# Patient Record
Sex: MALE | Race: WHITE | Employment: FULL TIME | URBAN - METROPOLITAN AREA
[De-identification: names, ages, dates, MRNs, and addresses within clinical notes are randomized per-mention and may not be internally consistent; named-entity substitution may affect disease eponyms.]

---

## 2017-01-12 ENCOUNTER — TELEPHONE (OUTPATIENT)
Dept: INTERNAL MEDICINE CLINIC | Facility: CLINIC | Age: 52
End: 2017-01-12

## 2017-01-12 DIAGNOSIS — E03.9 HYPOTHYROIDISM, UNSPECIFIED TYPE: Primary | ICD-10-CM

## 2017-01-12 NOTE — TELEPHONE ENCOUNTER
Pt calling and asking if his thyroid could be checked     Pt did not wish to make an appt just wants thyroid checked     He states it is the way he is feeling and wishes it to be checked an    If labs are too early he states he does not care if ins does no

## 2017-01-12 NOTE — TELEPHONE ENCOUNTER
TSH and free T4 ordered; please call pt; labs placed for [facility]; if pt requests [facility] lab order, then let me know; please call pt

## 2017-01-12 NOTE — TELEPHONE ENCOUNTER
Patient called back, states for the last month has had very little energy, believes it may be due to his thyroid, would like labs to get it checked.  To  to please advise

## 2017-01-12 NOTE — TELEPHONE ENCOUNTER
I left message letting patient know that TSH and free T4 ordered by Neftali Willis and order is for [facility] lab. I asked patient to call back and let us know if he would like to get it done at [facility] or another lab.

## 2017-01-26 ENCOUNTER — APPOINTMENT (OUTPATIENT)
Dept: LAB | Age: 52
End: 2017-01-26
Attending: INTERNAL MEDICINE
Payer: COMMERCIAL

## 2017-01-26 PROCEDURE — 84439 ASSAY OF FREE THYROXINE: CPT | Performed by: INTERNAL MEDICINE

## 2017-01-26 PROCEDURE — 84443 ASSAY THYROID STIM HORMONE: CPT | Performed by: INTERNAL MEDICINE

## 2017-01-26 PROCEDURE — 36415 COLL VENOUS BLD VENIPUNCTURE: CPT | Performed by: INTERNAL MEDICINE

## 2017-01-30 ENCOUNTER — TELEPHONE (OUTPATIENT)
Dept: INTERNAL MEDICINE CLINIC | Facility: CLINIC | Age: 52
End: 2017-01-30

## 2017-01-30 DIAGNOSIS — E03.9 HYPOTHYROIDISM, UNSPECIFIED TYPE: Primary | ICD-10-CM

## 2017-01-31 NOTE — TELEPHONE ENCOUNTER
TSH 6.67 on levothyroxine 50 mcg po qD; Rec- advise increase levothyroxine to 75 mcg po qD and re-check TFTs (ordered) in one month; please call pt and please call Rx

## 2017-02-01 RX ORDER — LEVOTHYROXINE SODIUM 0.05 MG/1
TABLET ORAL
Qty: 1 TABLET | Refills: 0 | COMMUNITY
Start: 2017-02-01 | End: 2017-02-15 | Stop reason: DRUGHIGH

## 2017-02-01 NOTE — TELEPHONE ENCOUNTER
Relayed MD's message to patient---verbalized understanding  Pt will take 1.5 tablets of levothyroxine 50mcg to total 75mcg until he runs out of current bottle. He will then call the office for new Rx of levothyroxine 75mcg.

## 2017-02-15 RX ORDER — LEVOTHYROXINE SODIUM 0.07 MG/1
75 TABLET ORAL
Qty: 30 TABLET | Refills: 1 | Status: SHIPPED | OUTPATIENT
Start: 2017-02-15 | End: 2017-03-31

## 2017-02-15 NOTE — TELEPHONE ENCOUNTER
[phone]  Pt need new RX for Levothyroxine 75 mcg.  Pt is out of meds  [facility] [place]   To Rx low

## 2017-03-17 ENCOUNTER — APPOINTMENT (OUTPATIENT)
Dept: LAB | Age: 52
End: 2017-03-17
Attending: INTERNAL MEDICINE
Payer: COMMERCIAL

## 2017-03-17 LAB
T4 FREE SERPL-MCNC: 1.05 NG/DL (ref 0.58–1.64)
TSH SERPL-ACNC: 4.23 UIU/ML (ref 0.34–5.6)

## 2017-03-17 PROCEDURE — 84439 ASSAY OF FREE THYROXINE: CPT | Performed by: INTERNAL MEDICINE

## 2017-03-17 PROCEDURE — 84443 ASSAY THYROID STIM HORMONE: CPT | Performed by: INTERNAL MEDICINE

## 2017-03-29 ENCOUNTER — TELEPHONE (OUTPATIENT)
Dept: INTERNAL MEDICINE CLINIC | Facility: CLINIC | Age: 52
End: 2017-03-29

## 2017-03-31 RX ORDER — LEVOTHYROXINE SODIUM 0.07 MG/1
75 TABLET ORAL
Qty: 90 TABLET | Refills: 3 | Status: SHIPPED | OUTPATIENT
Start: 2017-03-31 | End: 2018-05-14

## 2017-05-08 RX ORDER — LEVOTHYROXINE SODIUM 0.07 MG/1
75 TABLET ORAL
Qty: 90 TABLET | Refills: 3 | OUTPATIENT
Start: 2017-05-08

## 2017-10-27 ENCOUNTER — TELEPHONE (OUTPATIENT)
Dept: INTERNAL MEDICINE CLINIC | Facility: CLINIC | Age: 52
End: 2017-10-27

## 2017-10-27 DIAGNOSIS — Z00.00 PHYSICAL EXAM, ANNUAL: Primary | ICD-10-CM

## 2017-10-27 DIAGNOSIS — E03.9 HYPOTHYROIDISM, UNSPECIFIED TYPE: ICD-10-CM

## 2017-10-27 DIAGNOSIS — Z12.5 SCREENING PSA (PROSTATE SPECIFIC ANTIGEN): ICD-10-CM

## 2017-10-29 NOTE — TELEPHONE ENCOUNTER
To MD:  The above refill request is for a controlled substance. Please indicate yes or no to refill 30 days supply plus one refill. If more refills are appropriate, please indicate quantity  Patient last seen 5/2/16 and med inactive in med module.

## 2017-10-30 ENCOUNTER — APPOINTMENT (OUTPATIENT)
Dept: LAB | Age: 52
End: 2017-10-30
Attending: INTERNAL MEDICINE
Payer: COMMERCIAL

## 2017-10-30 LAB
ALBUMIN SERPL BCP-MCNC: 4.7 G/DL (ref 3.5–4.8)
ALBUMIN/GLOB SERPL: 2 {RATIO} (ref 1–2)
ALP SERPL-CCNC: 73 U/L (ref 32–100)
ALT SERPL-CCNC: 38 U/L (ref 17–63)
ANION GAP SERPL CALC-SCNC: 8 MMOL/L (ref 0–18)
AST SERPL-CCNC: 23 U/L (ref 15–41)
BASOPHILS # BLD: 0.1 K/UL (ref 0–0.2)
BASOPHILS NFR BLD: 1 %
BILIRUB SERPL-MCNC: 0.8 MG/DL (ref 0.3–1.2)
BUN SERPL-MCNC: 9 MG/DL (ref 8–20)
BUN/CREAT SERPL: 8.7 (ref 10–20)
CALCIUM SERPL-MCNC: 9.4 MG/DL (ref 8.5–10.5)
CHLORIDE SERPL-SCNC: 103 MMOL/L (ref 95–110)
CHOLEST SERPL-MCNC: 180 MG/DL (ref 110–200)
CO2 SERPL-SCNC: 27 MMOL/L (ref 22–32)
CREAT SERPL-MCNC: 1.04 MG/DL (ref 0.5–1.5)
EOSINOPHIL # BLD: 0 K/UL (ref 0–0.7)
EOSINOPHIL NFR BLD: 1 %
ERYTHROCYTE [DISTWIDTH] IN BLOOD BY AUTOMATED COUNT: 13.3 % (ref 11–15)
GLOBULIN PLAS-MCNC: 2.4 G/DL (ref 2.5–3.7)
GLUCOSE SERPL-MCNC: 90 MG/DL (ref 70–99)
HCT VFR BLD AUTO: 51.6 % (ref 41–52)
HDLC SERPL-MCNC: 37 MG/DL
HGB BLD-MCNC: 17.5 G/DL (ref 13.5–17.5)
LDLC SERPL CALC-MCNC: 112 MG/DL (ref 0–99)
LYMPHOCYTES # BLD: 1.5 K/UL (ref 1–4)
LYMPHOCYTES NFR BLD: 17 %
MCH RBC QN AUTO: 31 PG (ref 27–32)
MCHC RBC AUTO-ENTMCNC: 33.9 G/DL (ref 32–37)
MCV RBC AUTO: 91.4 FL (ref 80–100)
MONOCYTES # BLD: 0.6 K/UL (ref 0–1)
MONOCYTES NFR BLD: 7 %
NEUTROPHILS # BLD AUTO: 6.8 K/UL (ref 1.8–7.7)
NEUTROPHILS NFR BLD: 75 %
NONHDLC SERPL-MCNC: 143 MG/DL
OSMOLALITY UR CALC.SUM OF ELEC: 284 MOSM/KG (ref 275–295)
PLATELET # BLD AUTO: 184 K/UL (ref 140–400)
PMV BLD AUTO: 9.6 FL (ref 7.4–10.3)
POTASSIUM SERPL-SCNC: 3.8 MMOL/L (ref 3.3–5.1)
PROT SERPL-MCNC: 7.1 G/DL (ref 5.9–8.4)
PSA SERPL-MCNC: 1.1 NG/ML (ref 0–4)
RBC # BLD AUTO: 5.65 M/UL (ref 4.5–5.9)
SODIUM SERPL-SCNC: 138 MMOL/L (ref 136–144)
T4 FREE SERPL-MCNC: 1.19 NG/DL (ref 0.58–1.64)
TRIGL SERPL-MCNC: 155 MG/DL (ref 1–149)
TSH SERPL-ACNC: 6.4 UIU/ML (ref 0.45–5.33)
WBC # BLD AUTO: 9 K/UL (ref 4–11)

## 2017-10-30 PROCEDURE — 80061 LIPID PANEL: CPT | Performed by: INTERNAL MEDICINE

## 2017-10-30 PROCEDURE — 80053 COMPREHEN METABOLIC PANEL: CPT | Performed by: INTERNAL MEDICINE

## 2017-10-30 PROCEDURE — 84439 ASSAY OF FREE THYROXINE: CPT | Performed by: INTERNAL MEDICINE

## 2017-10-30 PROCEDURE — 85025 COMPLETE CBC W/AUTO DIFF WBC: CPT | Performed by: INTERNAL MEDICINE

## 2017-10-30 PROCEDURE — 84443 ASSAY THYROID STIM HORMONE: CPT | Performed by: INTERNAL MEDICINE

## 2017-10-30 RX ORDER — LORAZEPAM 0.5 MG/1
TABLET ORAL
Qty: 60 TABLET | OUTPATIENT
Start: 2017-10-30

## 2017-10-30 RX ORDER — LORAZEPAM 0.5 MG/1
0.5 TABLET ORAL 2 TIMES DAILY PRN
Qty: 60 TABLET | Refills: 0 | COMMUNITY
Start: 2017-10-30 | End: 2018-01-18

## 2017-10-30 NOTE — TELEPHONE ENCOUNTER
Last OV May 2016; he is due for OV with labs; fasting labs ordered; may refill lorazepam 0.5 mg po BID prn, #60, zero refills; please call pt and [facility]

## 2017-10-30 NOTE — TELEPHONE ENCOUNTER
Called patient and left voicemail per hipaa relaying that he is due for annual physical appt, that Dr Agnes Coreas has ordered fasting labs for him to complete after fasting for 12 hours, instructed him to please call back to schedule annual physical, notified him that refill was sent to [facility].   Refill called into [facility] for lorazepam.

## 2018-01-18 ENCOUNTER — OFFICE VISIT (OUTPATIENT)
Dept: INTERNAL MEDICINE CLINIC | Facility: CLINIC | Age: 53
End: 2018-01-18

## 2018-01-18 VITALS
DIASTOLIC BLOOD PRESSURE: 78 MMHG | BODY MASS INDEX: 25.22 KG/M2 | HEART RATE: 93 BPM | HEIGHT: 70 IN | WEIGHT: 176.19 LBS | OXYGEN SATURATION: 98 % | TEMPERATURE: 98 F | SYSTOLIC BLOOD PRESSURE: 112 MMHG

## 2018-01-18 DIAGNOSIS — E03.9 HYPOTHYROIDISM, UNSPECIFIED TYPE: ICD-10-CM

## 2018-01-18 DIAGNOSIS — G47.00 INSOMNIA, UNSPECIFIED TYPE: ICD-10-CM

## 2018-01-18 DIAGNOSIS — Z00.00 PHYSICAL EXAM, ANNUAL: Primary | ICD-10-CM

## 2018-01-18 PROCEDURE — 99396 PREV VISIT EST AGE 40-64: CPT | Performed by: INTERNAL MEDICINE

## 2018-01-18 RX ORDER — LORAZEPAM 0.5 MG/1
0.5 TABLET ORAL 2 TIMES DAILY PRN
Qty: 60 TABLET | Refills: 4 | Status: SHIPPED
Start: 2018-01-18 | End: 2018-08-07

## 2018-01-18 NOTE — PROGRESS NOTES
Rogerio Mason is a 50year old male. HPI:   Patient presents with:  Checkup: follow up on recent blood test done.        51 y/o M here for physical exam;  no CP; no SOB; no headaches; no palpitation; recently lost job and has had depressed mood rash  Neurological:  Negative for gait disturbance; negative for paresthesias   All other review of systems are negative.               PHYSICAL EXAM:   Blood pressure 112/78, pulse 93, temperature 97.7 °F (36.5 °C), temperature source Oral, height 5' 10\"

## 2018-05-14 RX ORDER — LEVOTHYROXINE SODIUM 0.07 MG/1
75 TABLET ORAL
Qty: 90 TABLET | Refills: 3 | Status: SHIPPED | OUTPATIENT
Start: 2018-05-14 | End: 2019-06-26

## 2018-08-08 RX ORDER — LORAZEPAM 0.5 MG/1
TABLET ORAL
Qty: 60 TABLET | Refills: 4 | Status: SHIPPED
Start: 2018-08-08 | End: 2019-09-04

## 2018-08-09 RX ORDER — LORAZEPAM 0.5 MG/1
TABLET ORAL
Qty: 60 TABLET | OUTPATIENT
Start: 2018-08-09

## 2018-08-28 ENCOUNTER — TELEPHONE (OUTPATIENT)
Dept: INTERNAL MEDICINE CLINIC | Facility: CLINIC | Age: 53
End: 2018-08-28

## 2018-08-28 DIAGNOSIS — E03.9 HYPOTHYROIDISM, UNSPECIFIED TYPE: Primary | ICD-10-CM

## 2018-08-30 ENCOUNTER — APPOINTMENT (OUTPATIENT)
Dept: LAB | Age: 53
End: 2018-08-30
Attending: INTERNAL MEDICINE
Payer: COMMERCIAL

## 2018-08-30 DIAGNOSIS — E03.9 HYPOTHYROIDISM, UNSPECIFIED TYPE: ICD-10-CM

## 2018-08-30 LAB
T4 FREE SERPL-MCNC: 1.05 NG/DL (ref 0.58–1.64)
TSH SERPL-ACNC: 6.72 UIU/ML (ref 0.45–5.33)

## 2018-08-30 PROCEDURE — 36415 COLL VENOUS BLD VENIPUNCTURE: CPT

## 2018-08-30 PROCEDURE — 84443 ASSAY THYROID STIM HORMONE: CPT

## 2018-08-30 PROCEDURE — 84439 ASSAY OF FREE THYROXINE: CPT

## 2018-09-07 NOTE — TELEPHONE ENCOUNTER
Pt called for status, is out of medication  Pt used 2 of 3 refills, 3rd refill   [facility] [place]  Tasked to nursing

## 2018-09-07 NOTE — TELEPHONE ENCOUNTER
Lorazepam was refill 8/8/18 for #60/4. S/w pharmacist who confirmed receipt of script however it was on back order at the time. They now have medication in stock. They will fill and have ready for pt. Called pt and update provided.

## 2019-06-26 ENCOUNTER — TELEPHONE (OUTPATIENT)
Dept: INTERNAL MEDICINE CLINIC | Facility: CLINIC | Age: 54
End: 2019-06-26

## 2019-06-26 DIAGNOSIS — Z00.00 ANNUAL PHYSICAL EXAM: Primary | ICD-10-CM

## 2019-06-26 DIAGNOSIS — E03.9 HYPOTHYROIDISM, UNSPECIFIED TYPE: ICD-10-CM

## 2019-06-26 DIAGNOSIS — Z12.5 SCREENING FOR PROSTATE CANCER: ICD-10-CM

## 2019-06-26 RX ORDER — LEVOTHYROXINE SODIUM 0.07 MG/1
TABLET ORAL
Qty: 90 TABLET | Refills: 0 | Status: SHIPPED | OUTPATIENT
Start: 2019-06-26 | End: 2019-09-30

## 2019-06-26 NOTE — TELEPHONE ENCOUNTER
90 day RX sent for levothyroxine 75 mcg    Annual labs pended    To Dr Fabricio Garza to review and advise on lab orders thank you

## 2019-06-26 NOTE — TELEPHONE ENCOUNTER
Pt scheduled physical with Dr Roselia Hernandez on July 19  Please add lab order & pt will go prior to appt    Is out of medication and will need a refill to cover until he is seen    - sent back to RX for refill

## 2019-09-04 ENCOUNTER — TELEPHONE (OUTPATIENT)
Dept: INTERNAL MEDICINE CLINIC | Facility: CLINIC | Age: 54
End: 2019-09-04

## 2019-09-04 RX ORDER — CLOTRIMAZOLE AND BETAMETHASONE DIPROPIONATE 10; .64 MG/G; MG/G
1 CREAM TOPICAL 2 TIMES DAILY PRN
Qty: 45 G | Refills: 1 | Status: SHIPPED | OUTPATIENT
Start: 2019-09-04 | End: 2020-10-09 | Stop reason: ALTCHOICE

## 2019-09-04 RX ORDER — LORAZEPAM 0.5 MG/1
0.5 TABLET ORAL 2 TIMES DAILY PRN
Qty: 60 TABLET | Refills: 3 | Status: SHIPPED | OUTPATIENT
Start: 2019-09-04 | End: 2020-03-05

## 2019-09-04 NOTE — TELEPHONE ENCOUNTER
Pt requesting refill for a cream that he has used in the past for dry skin     Pt also requesting refill for:  Lorazepam    [facility]  Tasked to [employer]

## 2019-09-05 NOTE — TELEPHONE ENCOUNTER
Patient called and left detailed message stating Rx's for Lotrisone cream and Lorazepam has been sent to his pharmacy. Patient to call back office for any further questions.

## 2019-10-01 RX ORDER — LEVOTHYROXINE SODIUM 0.07 MG/1
TABLET ORAL
Qty: 30 TABLET | Refills: 0 | Status: SHIPPED | OUTPATIENT
Start: 2019-10-01 | End: 2019-10-04

## 2019-10-01 NOTE — TELEPHONE ENCOUNTER
Pt received a call from nursing, he is leaving [facility] soon but will have his labs drawn Friday 10/4   Tasked to nursing

## 2019-10-01 NOTE — TELEPHONE ENCOUNTER
Spoke with patient. He is going out of town in and hour and a half and is completely out of Levothyroxine. He states he is seeing Dr. Resendez Held on 10/4 and will have labs done that morning. I advised him on fasting labs. He verbalized understanding. Last TSH was abnormal in August 2018, but free T4 was normal.     Pharmacy verified. Short-term fill eRx'd.      KAYI Dr. Resendez Held.

## 2019-10-01 NOTE — TELEPHONE ENCOUNTER
LMTCB on cell (no answer/VM on home phone). Pt needs to complete fasting labs which have been ordered, preferable before appt with Dr. Kiya Negrete on 10/4/19.

## 2019-10-04 ENCOUNTER — OFFICE VISIT (OUTPATIENT)
Dept: INTERNAL MEDICINE CLINIC | Facility: CLINIC | Age: 54
End: 2019-10-04
Payer: COMMERCIAL

## 2019-10-04 ENCOUNTER — LAB ENCOUNTER (OUTPATIENT)
Dept: LAB | Age: 54
End: 2019-10-04
Attending: INTERNAL MEDICINE
Payer: COMMERCIAL

## 2019-10-04 VITALS
DIASTOLIC BLOOD PRESSURE: 78 MMHG | WEIGHT: 195 LBS | TEMPERATURE: 98 F | BODY MASS INDEX: 27.92 KG/M2 | RESPIRATION RATE: 12 BRPM | SYSTOLIC BLOOD PRESSURE: 112 MMHG | HEIGHT: 70 IN | HEART RATE: 76 BPM | OXYGEN SATURATION: 98 %

## 2019-10-04 DIAGNOSIS — F41.9 ANXIETY: ICD-10-CM

## 2019-10-04 DIAGNOSIS — Z00.00 ANNUAL PHYSICAL EXAM: Primary | ICD-10-CM

## 2019-10-04 DIAGNOSIS — E03.9 HYPOTHYROIDISM, UNSPECIFIED TYPE: ICD-10-CM

## 2019-10-04 DIAGNOSIS — Z00.00 ANNUAL PHYSICAL EXAM: ICD-10-CM

## 2019-10-04 DIAGNOSIS — Z12.5 SCREENING FOR PROSTATE CANCER: ICD-10-CM

## 2019-10-04 DIAGNOSIS — R35.89 POLYURIA: ICD-10-CM

## 2019-10-04 DIAGNOSIS — Z12.5 SCREENING PSA (PROSTATE SPECIFIC ANTIGEN): ICD-10-CM

## 2019-10-04 DIAGNOSIS — G47.00 INSOMNIA, UNSPECIFIED TYPE: ICD-10-CM

## 2019-10-04 PROCEDURE — 84443 ASSAY THYROID STIM HORMONE: CPT

## 2019-10-04 PROCEDURE — 80061 LIPID PANEL: CPT

## 2019-10-04 PROCEDURE — 85025 COMPLETE CBC W/AUTO DIFF WBC: CPT

## 2019-10-04 PROCEDURE — 84439 ASSAY OF FREE THYROXINE: CPT

## 2019-10-04 PROCEDURE — 99396 PREV VISIT EST AGE 40-64: CPT | Performed by: INTERNAL MEDICINE

## 2019-10-04 PROCEDURE — 80053 COMPREHEN METABOLIC PANEL: CPT

## 2019-10-04 PROCEDURE — 36415 COLL VENOUS BLD VENIPUNCTURE: CPT

## 2019-10-04 RX ORDER — LEVOTHYROXINE SODIUM 0.1 MG/1
100 TABLET ORAL
Qty: 90 TABLET | Refills: 3 | Status: SHIPPED | OUTPATIENT
Start: 2019-10-04 | End: 2020-10-09

## 2019-10-04 RX ORDER — DIVALPROEX SODIUM 500 MG/1
500 TABLET, EXTENDED RELEASE ORAL NIGHTLY
Qty: 30 TABLET | Refills: 5 | Status: SHIPPED | OUTPATIENT
Start: 2019-10-04 | End: 2020-04-06

## 2019-10-04 NOTE — PROGRESS NOTES
Katherine Altman is a 48year old male. HPI:   Patient presents with:  Physical: Annual physical. Pt reports he had labs drawn this am. States he has been having trouble sleeping and feeling depressed lately. Denies SI.  Pt does not want flu vaccin loss  Cardiovascular:  Negative for chest pain; negative palpitations  Respiratory:  Negative for cough, dyspnea and wheezing  Endocrine:  Negative for abnormal sleep patterns, increased activity, polydipsia and polyphagia  Gastrointestinal:  Negative for 0.5 mg po qHS prn; uses 2-3x per week, refill #60 , 4RF; has not responded to Ambien; sleeps 3-4 hours per night; trial Depakote  mg po qHS  Anxiety    On lorazepam 0.5 mg po BID prn anxiety; uses at night  Depression  Tried sertraline 50 mg po qD th

## 2020-03-03 NOTE — TELEPHONE ENCOUNTER
To Dr. Rafal Jay MD:  The above refill request is for a controlled substance. Please review pended medication order. Print and sign for staff to fax to pharmacy or prescribe electronically.

## 2020-03-05 RX ORDER — LORAZEPAM 0.5 MG/1
TABLET ORAL
Qty: 60 TABLET | Refills: 5 | Status: SHIPPED | OUTPATIENT
Start: 2020-03-05 | End: 2020-09-23

## 2020-04-05 ENCOUNTER — TELEPHONE (OUTPATIENT)
Dept: INTERNAL MEDICINE CLINIC | Facility: CLINIC | Age: 55
End: 2020-04-05

## 2020-04-06 RX ORDER — DIVALPROEX SODIUM 500 MG/1
TABLET, EXTENDED RELEASE ORAL
Qty: 30 TABLET | Refills: 5 | Status: SHIPPED | OUTPATIENT
Start: 2020-04-06 | End: 2020-10-09 | Stop reason: ALTCHOICE

## 2020-09-08 ENCOUNTER — NURSE ONLY (OUTPATIENT)
Dept: GASTROENTEROLOGY | Facility: CLINIC | Age: 55
End: 2020-09-08

## 2020-09-08 DIAGNOSIS — Z12.11 COLON CANCER SCREENING: Primary | ICD-10-CM

## 2020-09-08 NOTE — PROGRESS NOTES
PHONE ROOM, left message on cell voicemail to call back for our phone screen appt. I attempted x2 listed home phone, but did not go through. Send back to GI RNs as a message when patient calls back. Thanks. Pt making it 3hrs between respiratory treatments. I/E wheezing and occasionally coarse. RR- 20-30s. Sats- mid to high 90s. Retractions have improved throughout the night. Mom at bs. Will cont to monitor.

## 2020-09-08 NOTE — PROGRESS NOTES
Forwarded to [facility] LILLIAN HELM. This is patient's first colonoscopy--the questionnaire had mentioned 2017 but patient states error. Meds/allergies reviewed.     Denies:  Any upper or lower GI symptoms  Cardio problems/CA/stent/pacemaker/defibrillator  Hypertensi

## 2020-09-09 RX ORDER — POLYETHYLENE GLYCOL 3350, SODIUM CHLORIDE, SODIUM BICARBONATE, POTASSIUM CHLORIDE 420; 11.2; 5.72; 1.48 G/4L; G/4L; G/4L; G/4L
POWDER, FOR SOLUTION ORAL
Qty: 1 BOTTLE | Refills: 0 | Status: ON HOLD | OUTPATIENT
Start: 2020-09-09 | End: 2020-10-23

## 2020-09-09 NOTE — PROGRESS NOTES
Scheduling:  Colonoscopy with Dr. Bridgette Gutierrez or Dr. Rojas Moy w/ [facility] or [facility]  Dx: CRC screening  split dose trilyte sent e-scribe

## 2020-09-11 NOTE — PROGRESS NOTES
Scheduled for:  Colonoscopy - 93799  Provider Name:  Dr. Jay Santana  Date:  10/23/20  Location:  [facility]  Sedation:  MAC  Time:  2:00 pm (pt is aware to arrive at 1:00 pm)  Prep:  Trilyte, Prep instructions were given to pt over the phone, pt verbalized understandi

## 2020-09-22 ENCOUNTER — TELEPHONE (OUTPATIENT)
Dept: INTERNAL MEDICINE CLINIC | Facility: CLINIC | Age: 55
End: 2020-09-22

## 2020-09-23 RX ORDER — LORAZEPAM 0.5 MG/1
TABLET ORAL
Qty: 60 TABLET | Refills: 5 | Status: SHIPPED | OUTPATIENT
Start: 2020-09-23 | End: 2021-05-03

## 2020-09-23 NOTE — TELEPHONE ENCOUNTER
Last RX 3/5/20 #60 and 5 refills  Last OV 10/14/19    To MD:  The above refill request is for a controlled substance. Please review pended medication order. Print and sign for staff to fax to pharmacy or prescribe electronically.

## 2020-10-09 ENCOUNTER — OFFICE VISIT (OUTPATIENT)
Dept: INTERNAL MEDICINE CLINIC | Facility: CLINIC | Age: 55
End: 2020-10-09
Payer: COMMERCIAL

## 2020-10-09 VITALS
SYSTOLIC BLOOD PRESSURE: 118 MMHG | DIASTOLIC BLOOD PRESSURE: 90 MMHG | TEMPERATURE: 99 F | HEIGHT: 70 IN | BODY MASS INDEX: 28.35 KG/M2 | WEIGHT: 198 LBS | HEART RATE: 100 BPM | OXYGEN SATURATION: 99 %

## 2020-10-09 DIAGNOSIS — Z12.5 SCREENING PSA (PROSTATE SPECIFIC ANTIGEN): ICD-10-CM

## 2020-10-09 DIAGNOSIS — G47.00 INSOMNIA, UNSPECIFIED TYPE: ICD-10-CM

## 2020-10-09 DIAGNOSIS — F17.200 SMOKING: ICD-10-CM

## 2020-10-09 DIAGNOSIS — F41.9 ANXIETY: ICD-10-CM

## 2020-10-09 DIAGNOSIS — R35.89 POLYURIA: ICD-10-CM

## 2020-10-09 DIAGNOSIS — E03.9 HYPOTHYROIDISM, UNSPECIFIED TYPE: ICD-10-CM

## 2020-10-09 DIAGNOSIS — Z00.00 ANNUAL PHYSICAL EXAM: Primary | ICD-10-CM

## 2020-10-09 DIAGNOSIS — F32.A DEPRESSION, UNSPECIFIED DEPRESSION TYPE: ICD-10-CM

## 2020-10-09 PROCEDURE — 90686 IIV4 VACC NO PRSV 0.5 ML IM: CPT | Performed by: INTERNAL MEDICINE

## 2020-10-09 PROCEDURE — 3074F SYST BP LT 130 MM HG: CPT | Performed by: INTERNAL MEDICINE

## 2020-10-09 PROCEDURE — 3008F BODY MASS INDEX DOCD: CPT | Performed by: INTERNAL MEDICINE

## 2020-10-09 PROCEDURE — 3080F DIAST BP >= 90 MM HG: CPT | Performed by: INTERNAL MEDICINE

## 2020-10-09 PROCEDURE — 99396 PREV VISIT EST AGE 40-64: CPT | Performed by: INTERNAL MEDICINE

## 2020-10-09 PROCEDURE — 90471 IMMUNIZATION ADMIN: CPT | Performed by: INTERNAL MEDICINE

## 2020-10-09 RX ORDER — LEVOTHYROXINE SODIUM 0.1 MG/1
100 TABLET ORAL
Qty: 90 TABLET | Refills: 3 | Status: SHIPPED | OUTPATIENT
Start: 2020-10-09 | End: 2021-11-18

## 2020-10-09 NOTE — PROGRESS NOTES
Michelle Willingham is a 50year old male.     HPI:   Patient presents with:  Physical: annual      49 y/o M here for physical exam;  no CP; no SOB; no headaches; no palpitation; takes lorazepam 0.5 mg po qHS prn insomnia      HISTORY:  Past Medical Hist bruising  Integumentary:  Negative for pruritus and rash  Neurological:  Negative for gait disturbance; negative for paresthesias   All other review of systems are negative.         PHYSICAL EXAM:   Blood pressure 118/90, pulse 100, temperature 98.6 °F ([phone]  RTC 1 year            Orders This Visit:  Orders Placed This Encounter      CBC W Differential W Platelet [E]      Comp Metabolic Panel (14) [E]      PSA (Screening) [E]      Lipid Panel [E]      Thyroxine, Free [E]      TSH [E]      Urinalys

## 2020-10-20 ENCOUNTER — APPOINTMENT (OUTPATIENT)
Dept: LAB | Facility: HOSPITAL | Age: 55
End: 2020-10-20
Attending: INTERNAL MEDICINE
Payer: COMMERCIAL

## 2020-10-20 DIAGNOSIS — Z01.818 PRE-OP TESTING: ICD-10-CM

## 2020-10-23 ENCOUNTER — HOSPITAL ENCOUNTER (OUTPATIENT)
Facility: HOSPITAL | Age: 55
Setting detail: HOSPITAL OUTPATIENT SURGERY
Discharge: HOME | End: 2020-10-23
Attending: INTERNAL MEDICINE | Admitting: INTERNAL MEDICINE
Payer: COMMERCIAL

## 2020-10-23 ENCOUNTER — ANESTHESIA (OUTPATIENT)
Dept: ENDOSCOPY | Facility: HOSPITAL | Age: 55
End: 2020-10-23
Payer: COMMERCIAL

## 2020-10-23 ENCOUNTER — TELEPHONE (OUTPATIENT)
Dept: INTERNAL MEDICINE CLINIC | Facility: CLINIC | Age: 55
End: 2020-10-23

## 2020-10-23 ENCOUNTER — ANESTHESIA EVENT (OUTPATIENT)
Dept: ENDOSCOPY | Facility: HOSPITAL | Age: 55
End: 2020-10-23
Payer: COMMERCIAL

## 2020-10-23 VITALS
OXYGEN SATURATION: 99 % | DIASTOLIC BLOOD PRESSURE: 91 MMHG | HEART RATE: 80 BPM | WEIGHT: 198 LBS | HEIGHT: 70 IN | TEMPERATURE: 99 F | BODY MASS INDEX: 28.35 KG/M2 | RESPIRATION RATE: 16 BRPM | SYSTOLIC BLOOD PRESSURE: 135 MMHG

## 2020-10-23 DIAGNOSIS — Z12.11 COLON CANCER SCREENING: ICD-10-CM

## 2020-10-23 DIAGNOSIS — K63.5 COLON POLYP: ICD-10-CM

## 2020-10-23 DIAGNOSIS — Z01.818 PRE-OP TESTING: Primary | ICD-10-CM

## 2020-10-23 DIAGNOSIS — K64.9 HEMORRHOIDS: ICD-10-CM

## 2020-10-23 PROCEDURE — 0DBK8ZX EXCISION OF ASCENDING COLON, VIA NATURAL OR ARTIFICIAL OPENING ENDOSCOPIC, DIAGNOSTIC: ICD-10-PCS | Performed by: INTERNAL MEDICINE

## 2020-10-23 PROCEDURE — 45380 COLONOSCOPY AND BIOPSY: CPT | Performed by: INTERNAL MEDICINE

## 2020-10-23 PROCEDURE — 0DBH8ZX EXCISION OF CECUM, VIA NATURAL OR ARTIFICIAL OPENING ENDOSCOPIC, DIAGNOSTIC: ICD-10-PCS | Performed by: INTERNAL MEDICINE

## 2020-10-23 PROCEDURE — 0DBP8ZX EXCISION OF RECTUM, VIA NATURAL OR ARTIFICIAL OPENING ENDOSCOPIC, DIAGNOSTIC: ICD-10-PCS | Performed by: INTERNAL MEDICINE

## 2020-10-23 RX ORDER — DIVALPROEX SODIUM 500 MG/1
TABLET, EXTENDED RELEASE ORAL
Qty: 30 TABLET | Refills: 5 | OUTPATIENT
Start: 2020-10-23

## 2020-10-23 RX ORDER — SODIUM CHLORIDE, SODIUM LACTATE, POTASSIUM CHLORIDE, CALCIUM CHLORIDE 600; 310; 30; 20 MG/100ML; MG/100ML; MG/100ML; MG/100ML
INJECTION, SOLUTION INTRAVENOUS CONTINUOUS
Status: DISCONTINUED | OUTPATIENT
Start: 2020-10-23 | End: 2020-10-23

## 2020-10-23 RX ORDER — LIDOCAINE HYDROCHLORIDE 10 MG/ML
INJECTION, SOLUTION EPIDURAL; INFILTRATION; INTRACAUDAL; PERINEURAL AS NEEDED
Status: DISCONTINUED | OUTPATIENT
Start: 2020-10-23 | End: 2020-10-23 | Stop reason: SURG

## 2020-10-23 RX ORDER — DIVALPROEX SODIUM 500 MG/1
TABLET, EXTENDED RELEASE ORAL
Qty: 30 TABLET | Refills: 5 | Status: SHIPPED | OUTPATIENT
Start: 2020-10-23 | End: 2021-05-03

## 2020-10-23 RX ADMIN — LIDOCAINE HYDROCHLORIDE 50 MG: 10 INJECTION, SOLUTION EPIDURAL; INFILTRATION; INTRACAUDAL; PERINEURAL at 13:50:00

## 2020-10-23 RX ADMIN — SODIUM CHLORIDE, SODIUM LACTATE, POTASSIUM CHLORIDE, CALCIUM CHLORIDE: 600; 310; 30; 20 INJECTION, SOLUTION INTRAVENOUS at 14:18:00

## 2020-10-23 NOTE — H&P
Pre Procedure History & Physical Examination    Patient Name: Margarette Love  MRN: [id number]  [facility]: [id number]  YOB: 1965    Diagnosis: screening      •  Levothyroxine Sodium 100 MCG Oral Tab, Take 1 tablet (100 mcg total) by mouth be kg/m²       Gen: Patient appears comfortable and in no acute discomfort  HEENT: the sclera appears anicteric, oropharynx clear, mucus membranes appear moist  CV: regular rate and rhythm, the extremities are warm and well perfused   Lung: Moves air well; No

## 2020-10-23 NOTE — ANESTHESIA POSTPROCEDURE EVALUATION
Patient: Rex Orozco    Procedure Summary     Date: 10/23/20 Room / Location: [facility] ENDOSCOPY 04 / [facility] ENDOSCOPY    Anesthesia Start: 0505 Anesthesia Stop: 6188    Procedure: COLONOSCOPY (N/A ) Diagnosis:       Colon cancer screening      (Polyps

## 2020-10-23 NOTE — TELEPHONE ENCOUNTER
Rx not currently on active med list. Last prescribed 4/6/20 for a 6mo supply. Rx was d/c'd on 10/9/20 by Ezra Castañeda RN as therapy completed.      See Dr. Hayes Gonsalves note \"Depression- Tried sertraline 50 mg po qD though did not respond, so he stopped med; did

## 2020-10-23 NOTE — ANESTHESIA PREPROCEDURE EVALUATION
Anesthesia PreOp Note    HPI:     Lindi Canavan is a 50year old male who presents for preoperative consultation requested by: Apurva Lehman MD    Date of Surgery: 10/23/2020    Procedure(s):  COLONOSCOPY  Indication: Colon cancer screening    Rel Tobacco Use      Smoking status: Current Every Day Smoker        Types: Cigarettes      Smokeless tobacco: Never Used    Substance and Sexual Activity      Alcohol use: Yes        Comment: 2 drinks weekly      Drug use: No      Sexual activity: Not on fi Exercise tolerance: good    Neuro/Psych    (+)  neuromuscular disease (Bell's Palsy age 25), anxiety/panic attacks,        GI/Hepatic/Renal - negative ROS   (+) bowel prep    Endo/Other    (+) hypothyroidism,   (-) diabetes mellitus  Abdominal  - normal ex

## 2020-10-23 NOTE — TELEPHONE ENCOUNTER
The original prescription was discontinued on 10/9/2020 by Romana Plumber, RN for the following reason: Therapy completed    See Dr. Lillie Sandoval note \"Depression- Tried sertraline 50 mg po qD though did not respond, so he stopped med; did npt respond to Depakote

## 2020-10-23 NOTE — OPERATIVE REPORT
COLONOSCOPY REPORT    Lindi Canavan     1965 Age 50year old   PCP Jacque aSntacruz MD Endoscopist Margaret Ridley MD     Date of procedure: 10/23/20    Procedure: Colonoscopy w/ cold biopsy polypectomy    Pre-operative diagnosis: screening retrieved. 2. Diverticulosis: none appreciated. 3. Terminal ileum: the visualized mucosa appeared normal.    4. A retroflexed view of the rectum revealed hemorrhoids.     5. The colonic mucosa throughout the colon showed normal vascular pattern, witho

## 2020-10-27 ENCOUNTER — TELEPHONE (OUTPATIENT)
Dept: GASTROENTEROLOGY | Facility: CLINIC | Age: 55
End: 2020-10-27

## 2020-10-27 NOTE — TELEPHONE ENCOUNTER
Rafy Sánchez MD  [facility] Clinical Staff             GI staff: please place recall for colonoscopy in 3 years

## 2020-10-27 NOTE — TELEPHONE ENCOUNTER
Health maintenance updated. 3 year colonoscopy recall placed in patient outreach. Due on 10/23/2023 per Dr. Kermit Goldberg.

## 2021-05-01 ENCOUNTER — TELEPHONE (OUTPATIENT)
Dept: INTERNAL MEDICINE CLINIC | Facility: CLINIC | Age: 56
End: 2021-05-01

## 2021-05-03 RX ORDER — LORAZEPAM 0.5 MG/1
TABLET ORAL
Qty: 60 TABLET | Refills: 5 | Status: SHIPPED | OUTPATIENT
Start: 2021-05-03 | End: 2021-12-06

## 2021-05-03 RX ORDER — DIVALPROEX SODIUM 500 MG/1
TABLET, EXTENDED RELEASE ORAL
Qty: 30 TABLET | Refills: 5 | Status: SHIPPED | OUTPATIENT
Start: 2021-05-03 | End: 2021-12-06

## 2021-11-03 ENCOUNTER — TELEPHONE (OUTPATIENT)
Dept: INTERNAL MEDICINE CLINIC | Facility: CLINIC | Age: 56
End: 2021-11-03

## 2021-11-03 DIAGNOSIS — R35.89 POLYURIA: ICD-10-CM

## 2021-11-03 DIAGNOSIS — E03.9 HYPOTHYROIDISM, UNSPECIFIED TYPE: Primary | ICD-10-CM

## 2021-11-03 DIAGNOSIS — Z12.5 SCREENING FOR PROSTATE CANCER: ICD-10-CM

## 2021-11-03 DIAGNOSIS — Z00.00 ANNUAL PHYSICAL EXAM: ICD-10-CM

## 2021-11-18 RX ORDER — LEVOTHYROXINE SODIUM 0.1 MG/1
TABLET ORAL
Qty: 30 TABLET | Refills: 0 | Status: SHIPPED | OUTPATIENT
Start: 2021-11-18 | End: 2021-12-06

## 2021-12-06 ENCOUNTER — LAB ENCOUNTER (OUTPATIENT)
Dept: LAB | Age: 56
End: 2021-12-06
Attending: INTERNAL MEDICINE
Payer: COMMERCIAL

## 2021-12-06 ENCOUNTER — OFFICE VISIT (OUTPATIENT)
Dept: INTERNAL MEDICINE CLINIC | Facility: CLINIC | Age: 56
End: 2021-12-06
Payer: COMMERCIAL

## 2021-12-06 VITALS
WEIGHT: 192.19 LBS | SYSTOLIC BLOOD PRESSURE: 110 MMHG | HEART RATE: 77 BPM | OXYGEN SATURATION: 99 % | DIASTOLIC BLOOD PRESSURE: 76 MMHG | HEIGHT: 69.2 IN | BODY MASS INDEX: 28.14 KG/M2

## 2021-12-06 DIAGNOSIS — Z23 NEED FOR VACCINATION: ICD-10-CM

## 2021-12-06 DIAGNOSIS — G47.00 INSOMNIA, UNSPECIFIED TYPE: ICD-10-CM

## 2021-12-06 DIAGNOSIS — Z00.00 ANNUAL PHYSICAL EXAM: ICD-10-CM

## 2021-12-06 DIAGNOSIS — Z00.00 ANNUAL PHYSICAL EXAM: Primary | ICD-10-CM

## 2021-12-06 DIAGNOSIS — F17.200 SMOKING: ICD-10-CM

## 2021-12-06 DIAGNOSIS — Z12.5 SCREENING FOR PROSTATE CANCER: ICD-10-CM

## 2021-12-06 DIAGNOSIS — L98.8 SKIN PLAQUE: ICD-10-CM

## 2021-12-06 DIAGNOSIS — F32.A DEPRESSION, UNSPECIFIED DEPRESSION TYPE: ICD-10-CM

## 2021-12-06 DIAGNOSIS — E03.9 HYPOTHYROIDISM, UNSPECIFIED TYPE: ICD-10-CM

## 2021-12-06 DIAGNOSIS — R35.89 POLYURIA: ICD-10-CM

## 2021-12-06 DIAGNOSIS — G47.33 OSA (OBSTRUCTIVE SLEEP APNEA): ICD-10-CM

## 2021-12-06 DIAGNOSIS — G47.10 HYPERSOMNOLENCE: ICD-10-CM

## 2021-12-06 PROCEDURE — 3074F SYST BP LT 130 MM HG: CPT | Performed by: INTERNAL MEDICINE

## 2021-12-06 PROCEDURE — 3078F DIAST BP <80 MM HG: CPT | Performed by: INTERNAL MEDICINE

## 2021-12-06 PROCEDURE — 90471 IMMUNIZATION ADMIN: CPT | Performed by: INTERNAL MEDICINE

## 2021-12-06 PROCEDURE — 99396 PREV VISIT EST AGE 40-64: CPT | Performed by: INTERNAL MEDICINE

## 2021-12-06 PROCEDURE — 80053 COMPREHEN METABOLIC PANEL: CPT

## 2021-12-06 PROCEDURE — 90686 IIV4 VACC NO PRSV 0.5 ML IM: CPT | Performed by: INTERNAL MEDICINE

## 2021-12-06 PROCEDURE — 84439 ASSAY OF FREE THYROXINE: CPT

## 2021-12-06 PROCEDURE — 3008F BODY MASS INDEX DOCD: CPT | Performed by: INTERNAL MEDICINE

## 2021-12-06 PROCEDURE — 36415 COLL VENOUS BLD VENIPUNCTURE: CPT

## 2021-12-06 PROCEDURE — 84443 ASSAY THYROID STIM HORMONE: CPT

## 2021-12-06 PROCEDURE — 81003 URINALYSIS AUTO W/O SCOPE: CPT

## 2021-12-06 PROCEDURE — 85025 COMPLETE CBC W/AUTO DIFF WBC: CPT

## 2021-12-06 PROCEDURE — 80061 LIPID PANEL: CPT

## 2021-12-06 RX ORDER — CLOTRIMAZOLE AND BETAMETHASONE DIPROPIONATE 10; .64 MG/G; MG/G
CREAM TOPICAL
Qty: 45 G | Refills: 3 | Status: SHIPPED | OUTPATIENT
Start: 2021-12-06

## 2021-12-06 RX ORDER — LEVOTHYROXINE SODIUM 0.1 MG/1
100 TABLET ORAL
Qty: 90 TABLET | Refills: 3 | Status: SHIPPED | OUTPATIENT
Start: 2021-12-06

## 2021-12-06 NOTE — PROGRESS NOTES
Dudley Goyal is a 49year old male.     HPI:   Patient presents with:  Physical: pt here for annual exam       50 y/o M here for physical exam; was on lorazepam 0.5 mg po qHS prn insomnia, tough he stopped med as it was not helping; +unrefreshing Negative for easy bleeding and easy bruising  Integumentary:  Negative for pruritus and rash  Neurological:  Negative for gait disturbance; negative for paresthesias   All other review of systems are negative.         PHYSICAL EXAM:   Blood pressure 110/76, Dr Cary Powell with three polyps removed; repeat in 3 years, or Oct 2023 per Dr Cary Powell     PSA screening  PSA 0.93 in Oct 2019    Hypersomnolence  +unrefreshing sleep; +daytime hypersomnolence; +snoring;   -home sleep study ordered    Skin Plaque  Favor psoriatic

## 2022-05-21 ENCOUNTER — TELEPHONE (OUTPATIENT)
Dept: INTERNAL MEDICINE CLINIC | Facility: CLINIC | Age: 57
End: 2022-05-21

## 2022-05-21 DIAGNOSIS — R09.81 SINUS CONGESTION: Primary | ICD-10-CM

## 2022-05-21 DIAGNOSIS — J02.9 SORE THROAT: ICD-10-CM

## 2022-05-21 RX ORDER — AZITHROMYCIN 250 MG/1
TABLET, FILM COATED ORAL
Qty: 6 TABLET | Refills: 0 | Status: SHIPPED | OUTPATIENT
Start: 2022-05-21 | End: 2022-05-26

## 2022-05-21 NOTE — TELEPHONE ENCOUNTER
On call telephone call from pt. Pt c/o sinus congestion and restriction for 2 days-\"like his usual sinus infections\". Pt also has a sore throat. I did explain to pt that he may have a sinus infection but his symptoms are also concerning for Covid. Pt has received his initial Covid vaccines and has been \"boosted\". Pt as also had Covid in the past.  I have sent a Rx for Zithromax to his pharmacy. I have also placed an order for a Covid PCR test in the system and have given pt telephone number to call to schedule Covid PCR test.  Pt is to push fluids and use Tylenol as necessary. I will forward this message to Dr Apryl Aguiar as an Yogi Gomez.

## 2022-06-23 ENCOUNTER — TELEPHONE (OUTPATIENT)
Dept: INTERNAL MEDICINE CLINIC | Facility: CLINIC | Age: 57
End: 2022-06-23

## 2022-06-23 RX ORDER — LORAZEPAM 0.5 MG/1
TABLET ORAL
Qty: 60 TABLET | Refills: 0 | OUTPATIENT
Start: 2022-06-23

## 2022-06-23 NOTE — TELEPHONE ENCOUNTER
Patient calling asking why Lorazepam was denied. Anxiety is still comes and goes  Patient is taking only as needed, does have a few pills left. Just figured to refill with other medications.      Best call back number [phone]

## 2022-06-24 RX ORDER — LORAZEPAM 0.5 MG/1
0.5 TABLET ORAL 2 TIMES DAILY PRN
Qty: 30 TABLET | Refills: 5 | Status: SHIPPED | OUTPATIENT
Start: 2022-06-24

## 2022-08-31 RX ORDER — CLOTRIMAZOLE AND BETAMETHASONE DIPROPIONATE 10; .64 MG/G; MG/G
CREAM TOPICAL
Qty: 45 G | Refills: 3 | Status: SHIPPED | OUTPATIENT
Start: 2022-08-31

## 2022-09-28 NOTE — TELEPHONE ENCOUNTER
Dr. Cesar Pierre please advise.
Labs 3/17/17 reviewed; TSH 4.23, free T4 1.05 on levothyroxine to 75 mcg po qD; Imp hypothyroidism;  Rec- same Rx; next TFTs due in one year; please call pt
Left message on patients cell phone answering machine (per HIPPA) with 's message regarding lab results. Levothyroxine refill eRxed to patients pharmacy as only sent for #30 with 1 refill in January.
Please call pt with lab results [address] to wait for Dr Matais Wiggins
None

## 2022-11-27 ENCOUNTER — TELEPHONE (OUTPATIENT)
Dept: INTERNAL MEDICINE CLINIC | Facility: CLINIC | Age: 57
End: 2022-11-27

## 2022-11-28 NOTE — TELEPHONE ENCOUNTER
Left message to call the office to schedule yearly physical Griseofulvin Pregnancy And Lactation Text: This medication is Pregnancy Category X and is known to cause serious birth defects. It is unknown if this medication is excreted in breast milk but breast feeding should be avoided.

## 2022-11-28 NOTE — TELEPHONE ENCOUNTER
TO FD to please call patient to schedule annual physical and then route back to rx. Last physical 12/6/21, nothing scheduled at this time. Thanks!

## 2022-12-27 RX ORDER — LEVOTHYROXINE SODIUM 0.1 MG/1
TABLET ORAL
Qty: 90 TABLET | Refills: 0 | Status: SHIPPED | OUTPATIENT
Start: 2022-12-27

## 2023-02-22 RX ORDER — LEVOTHYROXINE SODIUM 0.1 MG/1
TABLET ORAL
Qty: 90 TABLET | Refills: 3 | OUTPATIENT
Start: 2023-02-22

## 2023-03-28 RX ORDER — LEVOTHYROXINE SODIUM 0.1 MG/1
TABLET ORAL
Qty: 90 TABLET | Refills: 0 | OUTPATIENT
Start: 2023-03-28

## 2023-03-28 RX ORDER — LEVOTHYROXINE SODIUM 0.1 MG/1
TABLET ORAL
Qty: 30 TABLET | Refills: 0 | Status: SHIPPED | OUTPATIENT
Start: 2023-03-28

## 2023-03-28 NOTE — TELEPHONE ENCOUNTER
Patient was called per request below. No answer. A message was left to call back. Patient is due for an annual physical with Dr Yeimi Adkins.

## 2023-04-04 ENCOUNTER — TELEPHONE (OUTPATIENT)
Dept: INTERNAL MEDICINE CLINIC | Facility: CLINIC | Age: 58
End: 2023-04-04

## 2023-04-04 RX ORDER — AZITHROMYCIN 250 MG/1
TABLET, FILM COATED ORAL
Qty: 6 TABLET | Refills: 0 | Status: SHIPPED | OUTPATIENT
Start: 2023-04-04 | End: 2023-04-09

## 2023-04-04 NOTE — TELEPHONE ENCOUNTER
Patient is calling with a sinus infection. Patient states his symptoms started last night around 5pm. Patient is experiencing a throbbing headache, congestion, felt warm. Denies any cough. Patient has not been tested for covid. Patient is fully vaccinated. Patient states \"This is NOT covid, I had covid three times already. \" Patient is hoping for a z-debora to help with his symptoms.       # [phone]  [facility] [place] [facility]

## 2023-04-04 NOTE — TELEPHONE ENCOUNTER
Please notify the patient:    - Trial of flonase 1 spray each nostril until symptoms improve  - Trial of antihistamine cetirizine 10 mg (Zyrtec)  - Antibiotics: Azithromycin 2 tabs on day 1, then 1 tab for the next 4 days    Follow-up with Dr. Angelika Sommers as scheduled 4/7

## 2023-04-07 ENCOUNTER — LAB ENCOUNTER (OUTPATIENT)
Dept: LAB | Age: 58
End: 2023-04-07
Attending: INTERNAL MEDICINE
Payer: COMMERCIAL

## 2023-04-07 ENCOUNTER — OFFICE VISIT (OUTPATIENT)
Dept: INTERNAL MEDICINE CLINIC | Facility: CLINIC | Age: 58
End: 2023-04-07

## 2023-04-07 VITALS
OXYGEN SATURATION: 100 % | WEIGHT: 183.81 LBS | HEIGHT: 69.3 IN | TEMPERATURE: 97 F | BODY MASS INDEX: 26.91 KG/M2 | DIASTOLIC BLOOD PRESSURE: 80 MMHG | SYSTOLIC BLOOD PRESSURE: 102 MMHG | HEART RATE: 89 BPM

## 2023-04-07 DIAGNOSIS — E03.9 HYPOTHYROIDISM, UNSPECIFIED TYPE: ICD-10-CM

## 2023-04-07 DIAGNOSIS — R35.89 POLYURIA: ICD-10-CM

## 2023-04-07 DIAGNOSIS — Z12.5 SCREENING FOR PROSTATE CANCER: ICD-10-CM

## 2023-04-07 DIAGNOSIS — Z00.00 ANNUAL PHYSICAL EXAM: ICD-10-CM

## 2023-04-07 DIAGNOSIS — Z00.00 ANNUAL PHYSICAL EXAM: Primary | ICD-10-CM

## 2023-04-07 DIAGNOSIS — G47.00 INSOMNIA, UNSPECIFIED TYPE: ICD-10-CM

## 2023-04-07 DIAGNOSIS — F32.A DEPRESSION, UNSPECIFIED DEPRESSION TYPE: ICD-10-CM

## 2023-04-07 LAB
ALBUMIN SERPL-MCNC: 4.4 G/DL (ref 3.4–5)
ALBUMIN/GLOB SERPL: 1.4 {RATIO} (ref 1–2)
ALP LIVER SERPL-CCNC: 101 U/L
ALT SERPL-CCNC: 33 U/L
ANION GAP SERPL CALC-SCNC: 4 MMOL/L (ref 0–18)
AST SERPL-CCNC: 19 U/L (ref 15–37)
BASOPHILS # BLD AUTO: 0.06 X10(3) UL (ref 0–0.2)
BASOPHILS NFR BLD AUTO: 1 %
BILIRUB SERPL-MCNC: 0.5 MG/DL (ref 0.1–2)
BILIRUB UR QL: NEGATIVE
BUN BLD-MCNC: 15 MG/DL (ref 7–18)
BUN/CREAT SERPL: 14.4 (ref 10–20)
CALCIUM BLD-MCNC: 9.7 MG/DL (ref 8.5–10.1)
CHLORIDE SERPL-SCNC: 106 MMOL/L (ref 98–112)
CHOLEST SERPL-MCNC: 192 MG/DL (ref ?–200)
CLARITY UR: CLEAR
CO2 SERPL-SCNC: 28 MMOL/L (ref 21–32)
COLOR UR: YELLOW
COMPLEXED PSA SERPL-MCNC: 1.08 NG/ML (ref ?–4)
CREAT BLD-MCNC: 1.04 MG/DL
DEPRECATED RDW RBC AUTO: 41.3 FL (ref 35.1–46.3)
EOSINOPHIL # BLD AUTO: 0.06 X10(3) UL (ref 0–0.7)
EOSINOPHIL NFR BLD AUTO: 1 %
ERYTHROCYTE [DISTWIDTH] IN BLOOD BY AUTOMATED COUNT: 12.3 % (ref 11–15)
FASTING PATIENT LIPID ANSWER: YES
FASTING STATUS PATIENT QL REPORTED: YES
GFR SERPLBLD BASED ON 1.73 SQ M-ARVRAT: 85 ML/MIN/1.73M2 (ref 60–?)
GLOBULIN PLAS-MCNC: 3.1 G/DL (ref 2.8–4.4)
GLUCOSE BLD-MCNC: 106 MG/DL (ref 70–99)
GLUCOSE UR-MCNC: NORMAL MG/DL
HCT VFR BLD AUTO: 53.5 %
HDLC SERPL-MCNC: 36 MG/DL (ref 40–59)
HGB BLD-MCNC: 17.9 G/DL
HGB UR QL STRIP.AUTO: NEGATIVE
HYALINE CASTS #/AREA URNS AUTO: PRESENT /LPF
IMM GRANULOCYTES # BLD AUTO: 0.01 X10(3) UL (ref 0–1)
IMM GRANULOCYTES NFR BLD: 0.2 %
KETONES UR-MCNC: NEGATIVE MG/DL
LDLC SERPL CALC-MCNC: 136 MG/DL (ref ?–100)
LEUKOCYTE ESTERASE UR QL STRIP.AUTO: NEGATIVE
LYMPHOCYTES # BLD AUTO: 1.11 X10(3) UL (ref 1–4)
LYMPHOCYTES NFR BLD AUTO: 18.8 %
MCH RBC QN AUTO: 30.4 PG (ref 26–34)
MCHC RBC AUTO-ENTMCNC: 33.5 G/DL (ref 31–37)
MCV RBC AUTO: 91 FL
MONOCYTES # BLD AUTO: 0.57 X10(3) UL (ref 0.1–1)
MONOCYTES NFR BLD AUTO: 9.7 %
NEUTROPHILS # BLD AUTO: 4.09 X10 (3) UL (ref 1.5–7.7)
NEUTROPHILS # BLD AUTO: 4.09 X10(3) UL (ref 1.5–7.7)
NEUTROPHILS NFR BLD AUTO: 69.3 %
NITRITE UR QL STRIP.AUTO: NEGATIVE
NONHDLC SERPL-MCNC: 156 MG/DL (ref ?–130)
OSMOLALITY SERPL CALC.SUM OF ELEC: 287 MOSM/KG (ref 275–295)
PH UR: 6 [PH] (ref 5–8)
PLATELET # BLD AUTO: 170 10(3)UL (ref 150–450)
POTASSIUM SERPL-SCNC: 4.9 MMOL/L (ref 3.5–5.1)
PROT SERPL-MCNC: 7.5 G/DL (ref 6.4–8.2)
RBC # BLD AUTO: 5.88 X10(6)UL
SODIUM SERPL-SCNC: 138 MMOL/L (ref 136–145)
SP GR UR STRIP: 1.03 (ref 1–1.03)
T4 FREE SERPL-MCNC: 1.3 NG/DL (ref 0.8–1.7)
TRIGL SERPL-MCNC: 109 MG/DL (ref 30–149)
TSI SER-ACNC: 5.47 MIU/ML (ref 0.36–3.74)
UROBILINOGEN UR STRIP-ACNC: NORMAL
VLDLC SERPL CALC-MCNC: 20 MG/DL (ref 0–30)
WBC # BLD AUTO: 5.9 X10(3) UL (ref 4–11)

## 2023-04-07 PROCEDURE — 3079F DIAST BP 80-89 MM HG: CPT | Performed by: INTERNAL MEDICINE

## 2023-04-07 PROCEDURE — 84443 ASSAY THYROID STIM HORMONE: CPT

## 2023-04-07 PROCEDURE — 80053 COMPREHEN METABOLIC PANEL: CPT

## 2023-04-07 PROCEDURE — 81001 URINALYSIS AUTO W/SCOPE: CPT

## 2023-04-07 PROCEDURE — 3074F SYST BP LT 130 MM HG: CPT | Performed by: INTERNAL MEDICINE

## 2023-04-07 PROCEDURE — 80061 LIPID PANEL: CPT

## 2023-04-07 PROCEDURE — 36415 COLL VENOUS BLD VENIPUNCTURE: CPT

## 2023-04-07 PROCEDURE — 84439 ASSAY OF FREE THYROXINE: CPT

## 2023-04-07 PROCEDURE — 3008F BODY MASS INDEX DOCD: CPT | Performed by: INTERNAL MEDICINE

## 2023-04-07 PROCEDURE — 99396 PREV VISIT EST AGE 40-64: CPT | Performed by: INTERNAL MEDICINE

## 2023-04-07 PROCEDURE — 85025 COMPLETE CBC W/AUTO DIFF WBC: CPT

## 2023-04-07 RX ORDER — LORAZEPAM 0.5 MG/1
0.5 TABLET ORAL 2 TIMES DAILY PRN
Qty: 60 TABLET | Refills: 3 | Status: SHIPPED | OUTPATIENT
Start: 2023-04-07

## 2023-04-07 RX ORDER — LEVOTHYROXINE SODIUM 0.1 MG/1
100 TABLET ORAL
Qty: 90 TABLET | Refills: 3 | Status: SHIPPED | OUTPATIENT
Start: 2023-04-07

## 2023-04-30 RX ORDER — LEVOTHYROXINE SODIUM 0.1 MG/1
TABLET ORAL
Qty: 30 TABLET | Refills: 0 | OUTPATIENT
Start: 2023-04-30

## 2023-07-24 ENCOUNTER — TELEPHONE (OUTPATIENT)
Facility: CLINIC | Age: 58
End: 2023-07-24

## 2023-07-24 NOTE — TELEPHONE ENCOUNTER
Patient outreach message received:    3 year colonoscopy recall placed in patient outreach. Due on 10/23/2023 per Dr. Cedrick Elizondo. Recall reminder letter mailed out to patient.

## 2023-08-04 ENCOUNTER — TELEPHONE (OUTPATIENT)
Facility: CLINIC | Age: 58
End: 2023-08-04

## 2023-08-04 DIAGNOSIS — Z86.010 HX OF COLONIC POLYPS: Primary | ICD-10-CM

## 2023-08-07 NOTE — TELEPHONE ENCOUNTER
Dr. Campos Nu Mine,   Please review and give orders when able. Thanks,  Gil Steel    Last Procedure, Date, MD:  Colonoscopy, 10/23/2020, Dr. Campos Suzanne  Last Diagnosis:  TA  Recalled (mth/yrs):  3 years  Sedation Used Previously:  MAC  Last Prep Used (if known):  trilyte  Quality Of Prep (if known): fair, good with washing  Anticoagulants: No  Diuretics: No  Diabetic Med's (PO/Injectables): No  Weight loss Med's: No  Iron/Herbal/Multivitamin Supplements (RX/OTC): No  Marijuana/Vaping/CBD: No  Height & Weight: 5'10\"/ 183 lbs  BMI: 26.3  Hx of Cardiac/CVA Issues/(MI/Stroke): No  Devices Pacemaker/Defibrillator/Stents: No  Respiratory Issues/Oxygen Use/JAVI/COPD: No  Issues w/ Anesthesia: No    Symptoms (Y/N): No  Symptoms Details: No    Special Comments/Notes: N/A    Please advise on orders and prep. Meds, allergies, and pharmacy verified with pt. Thank you! Bhavya Timmons MD   Physician  Gastroenterology     Operative Report  Signed     Date of Service: 10/23/2020  2:13 PM  Case Time: Procedures: Surgeons:   10/23/2020  1:52 PM COLONOSCOPY    Bhavya Timmons MD               Signed         COLONOSCOPY REPORT           Rufus Megan DOE 1965 Age 50year old   PCP Rajesh King MD Endoscopist Azul Cortez MD      Date of procedure: 10/23/20     Procedure: Colonoscopy w/ cold biopsy polypectomy     Pre-operative diagnosis: screening     Post-operative diagnosis: polyps and hemorrhoids      Medications: MAC sedation  Withdrawal time: 15 minutes     Procedure:  Informed consent was obtained from the patient after the risks of the procedure were discussed, including but not limited to bleeding, perforation, aspiration, infection, or possibility of a missed lesion.  After discussions of the risks/benefits and alternatives to this procedure, as well as the planned sedation, the patient was placed in the left lateral decubitus position and begun on continuous blood pressure pulse oximetry and EKG monitoring and this was maintained throughout the procedure. Once an adequate level of sedation was obtained a digital rectal exam was completed. Then the lubricated tip of the [id number] diagnostic video colonoscope was inserted and advanced without difficulty to the cecum using the CO2 insufflation technique. The cecum was identified by localizing the trifold, the appendix and the ileocecal valve. Withdrawal was begun with thorough washing and careful examination of the colonic walls and folds. A routine second examination of the cecum/ascending colon was performed. Photodocumentation was obtained. The bowel prep was fair. Views of the colon were good with washing. I then carefully withdrew the instrument from the patient who tolerated the procedure well. Complications: none. Findings:   1. 3 polyp(s) noted as follows:      A. 3 mm polyp in the cecum; flat morphology; cold biopsy polypectomy and retrieved. B. 2 mm polyp in the ascending colon; flat morphology; cold biopsy polypectomy and retrieved. C. 2 mm polyp in the rectum; flat morphology; cold biopsy polypectomy and retrieved. 2. Diverticulosis: none appreciated. 3. Terminal ileum: the visualized mucosa appeared normal.     4. A retroflexed view of the rectum revealed hemorrhoids. 5. The colonic mucosa throughout the colon showed normal vascular pattern, without evidence of angioectasias or inflammation. 6. SHANNON: normal rectal tone, no masses palpated. Recommend:  Await pathology. The interval for the next colonoscopy will be determined after reviewing pathology, 3-5 years. If new signs or symptoms develop, colonoscopy may need to be repeated sooner. High fiber diet. Monitor for blood in the stool.  If having more than just tinge of blood, call office or go to the ER.     >>>If tissue was obtained and you have not received your pathology results either by phone or letter within 2 weeks, please call our office at [phone]. Specimens: cecal, ascending and rectal polyps                  Electronically signed by Jose A Muhammad MD at 10/23/2020  2:14 PM    Final Diagnosis:      A. Cecal colon polyp; biopsy:   Colonic mucosa with prominent lymphoid aggregates     B. Ascending colon polyp; biopsy:  Tubular adenoma     C.  Rectal polyp; biopsy:   Tubular adenoma

## 2023-08-15 RX ORDER — SODIUM, POTASSIUM,MAG SULFATES 17.5-3.13G
SOLUTION, RECONSTITUTED, ORAL ORAL
Qty: 1 EACH | Refills: 0 | Status: SHIPPED | OUTPATIENT
Start: 2023-08-15

## 2023-08-15 NOTE — TELEPHONE ENCOUNTER
1. Schedule colonoscopy with MAC [Diagnosis: history of polyps]    2.  bowel prep from pharmacy (split suprep or trilyte)    3. Continue all medications for procedure except    ** If MAC @ [facility]/[place]:    - NO alcohol, recreational drugs nor erectile dysfunction mediations 24 hours before procedure(s)   - NO herbal supplements or weight loss medications x 7 days prior to the procedure(s)    ** [facility] continue all medications as prescribed      4. Read all bowel prep instructions carefully    5. AVOID seeds, nuts, popcorn, raw fruits and vegetables (cooked is okay) for 2-3 days before procedure    >>>Please note: if you were prescribed Suprep for the bowel prep and it is too expensive or not covered by insurance, it is okay to substitute Trilyte (or any similar generic prep). This can be done by notifying the pharmacy or calling our office.

## 2023-09-20 NOTE — TELEPHONE ENCOUNTER
Scheduled for:  Colonoscopy [place]  Provider Name:  Dr. Gavin Chilel  Date:  1/11/2024  Location:    [facility]  Sedation:  mac  Time:  2:00 (patient is aware arrival time is 1:00)  Prep:  suprep  Meds/Allergies Reconciled?:  Physician reviewed   Diagnosis with codes:  hx of polyps Z86.010  Was patient informed to call insurance with codes (Y/N):  Yes, I confirmed bc[employer] insurance with the patient. Referral sent?:  Referral was sent at the time of electronic surgical scheduling. [address] [facility] or [facility] [place] [address] notified?:  I sent an electronic request to Endo Scheduling and received a confirmation today. Medication Orders: This patient verbally confirmed that he is not taking:   Iron, blood thinners, BP meds, and is not diabetic   Not latex allergy, Not PCN allergy and does not have a pacemaker  Misc Orders:     I discussed the prep instructions with the patient which she verbally understood and is aware that I will mail the instructions today.     Further instructions given by staff:

## 2024-01-11 ENCOUNTER — ANESTHESIA (OUTPATIENT)
Dept: ENDOSCOPY | Age: 59
End: 2024-01-11
Payer: COMMERCIAL

## 2024-01-11 ENCOUNTER — ANESTHESIA EVENT (OUTPATIENT)
Dept: ENDOSCOPY | Age: 59
End: 2024-01-11
Payer: COMMERCIAL

## 2024-01-11 ENCOUNTER — HOSPITAL ENCOUNTER (OUTPATIENT)
Age: 59
Setting detail: HOSPITAL OUTPATIENT SURGERY
Discharge: HOME | End: 2024-01-11
Attending: INTERNAL MEDICINE | Admitting: INTERNAL MEDICINE
Payer: COMMERCIAL

## 2024-01-11 VITALS
TEMPERATURE: 98 F | OXYGEN SATURATION: 99 % | HEIGHT: 70 IN | HEART RATE: 92 BPM | SYSTOLIC BLOOD PRESSURE: 108 MMHG | DIASTOLIC BLOOD PRESSURE: 77 MMHG | WEIGHT: 185 LBS | RESPIRATION RATE: 15 BRPM | BODY MASS INDEX: 26.48 KG/M2

## 2024-01-11 DIAGNOSIS — Z86.010 HX OF COLONIC POLYPS: ICD-10-CM

## 2024-01-11 PROCEDURE — 88305 TISSUE EXAM BY PATHOLOGIST: CPT | Performed by: INTERNAL MEDICINE

## 2024-01-11 RX ORDER — LIDOCAINE HYDROCHLORIDE 10 MG/ML
INJECTION, SOLUTION EPIDURAL; INFILTRATION; INTRACAUDAL; PERINEURAL AS NEEDED
Status: DISCONTINUED | OUTPATIENT
Start: 2024-01-11 | End: 2024-01-11 | Stop reason: SURG

## 2024-01-11 RX ORDER — SODIUM CHLORIDE, SODIUM LACTATE, POTASSIUM CHLORIDE, CALCIUM CHLORIDE 600; 310; 30; 20 MG/100ML; MG/100ML; MG/100ML; MG/100ML
INJECTION, SOLUTION INTRAVENOUS CONTINUOUS
Status: DISCONTINUED | OUTPATIENT
Start: 2024-01-11 | End: 2024-01-11

## 2024-01-11 RX ADMIN — SODIUM CHLORIDE, SODIUM LACTATE, POTASSIUM CHLORIDE, CALCIUM CHLORIDE: 600; 310; 30; 20 INJECTION, SOLUTION INTRAVENOUS at 14:21:00

## 2024-01-11 RX ADMIN — SODIUM CHLORIDE, SODIUM LACTATE, POTASSIUM CHLORIDE, CALCIUM CHLORIDE: 600; 310; 30; 20 INJECTION, SOLUTION INTRAVENOUS at 13:50:00

## 2024-01-11 RX ADMIN — LIDOCAINE HYDROCHLORIDE 25 MG: 10 INJECTION, SOLUTION EPIDURAL; INFILTRATION; INTRACAUDAL; PERINEURAL at 13:58:00

## 2024-01-11 NOTE — OPERATIVE REPORT
COLONOSCOPY REPORT    Collin Abdullahi     1965 Age 58 year old   PCP Gurvinder Giron MD Endoscopist Yinka Gonzalez MD     Date of procedure: 24    Procedure: Colonoscopy w/cold biopsy polypectomy    Pre-operative diagnosis: history of polyps    Post-operative diagnosis: polyps and hemorrhoids    Medications: MAC sedation    Withdrawal time: 17 minutes    Procedure:  Informed consent was obtained from the patient after the risks of the procedure were discussed, including but not limited to bleeding, perforation, aspiration, infection, or possibility of a missed lesion. After discussions of the risks/benefits and alternatives to this procedure, as well as the planned sedation, the patient was placed in the left lateral decubitus position and begun on continuous blood pressure pulse oximetry and EKG monitoring and this was maintained throughout the procedure. Once an adequate level of sedation was obtained a digital rectal exam was completed. Then the lubricated tip of the [id number] diagnostic video colonoscope was inserted and advanced without difficulty to the cecum using the CO2 insufflation technique. The cecum was identified by localizing the trifold, the appendix and the ileocecal valve. Withdrawal was begun with thorough washing and careful examination of the colonic walls and folds. A routine second examination of the cecum/ascending colon was performed. Photodocumentation was obtained. The bowel prep was good. Views of the colon were good with washing. I then carefully withdrew the instrument from the patient who tolerated the procedure well.     Complications: none.    Findings:   1. 4 polyp(s) noted as follows:      A. 3 mm polyp in the ascending colon; flat morphology; cold biopsy polypectomy and retrieved.      B. 4 mm polyp in the transverse colon; flat morphology; cold biopsy polypectomy and retrieved.      C. 3-4 mm polyps x2 in the rectosigmoid colon; flat morphology; cold  biopsy polypectomy and retrieved.    2. Diverticulosis: none appreciated.    3. Terminal ileum: the visualized mucosa appeared normal.    4. A retroflexed view of the rectum revealed hemorrhoids.    5. The colonic mucosa throughout the colon showed normal vascular pattern, without evidence of angioectasias or inflammation.     6. SHANNON: normal rectal tone, no masses palpated.     Recommend:  Await pathology, likely repeat colonoscopy in 3-5 years. The interval for the next colonoscopy will be determined after reviewing pathology. If new signs or symptoms develop, colonoscopy may need to be repeated sooner.   High fiber diet.  Monitor for blood in the stool. If having more than just tinge of blood, call office or go to the ER.      >>>If tissue was obtained and you have not received your pathology results either by phone or letter within 2 weeks, please call our office at [phone].    Specimens: ascending, transverse and rectosigmoid polyps

## 2024-01-11 NOTE — PRE-SEDATION ASSESSMENT
Physician Pre-Sedation Assessment    Pre-Sedation Assessment:    Sedation History: Previous Sedation with No Complications and Airway Assessed    Cardiac: normal S1, S2  Respiratory: breath sounds clear bilaterally   Abdomen: soft, BS (+), non-tender    ASA Classification: 2. Patient with mild systemic disease    Plan: MAC sedation

## 2024-01-11 NOTE — ANESTHESIA PREPROCEDURE EVALUATION
Anesthesia PreOp Note    HPI:     Collin Abdullahi is a 58 year old male who presents for preoperative consultation requested by: Yinka Gonzalez MD    Date of Surgery: 1/11/2024    Procedure(s):  COLONOSCOPY  Indication: Hx of colonic polyps    Relevant Problems   No relevant active problems       NPO:  Last Liquid Consumption Date: 01/11/24  Last Liquid Consumption Time: 0830  Last Solid Consumption Date: 01/09/24  Last Solid Consumption Time: 1800  Last Liquid Consumption Date: 01/11/24          History Review:  Patient Active Problem List    Diagnosis Date Noted    Hypothyroidism 08/22/2014    Anxiety 08/22/2014    Insomnia 08/22/2014       Past Medical History:   Diagnosis Date    Bell's palsy     Disorder of thyroid     Hypothyroidism     Status post wrist surgery        Past Surgical History:   Procedure Laterality Date    COLONOSCOPY N/A 10/23/2020    Procedure: COLONOSCOPY;  Surgeon: Yinka Gonzalez MD;  Location: [facility] ENDOSCOPY       Medications Prior to Admission   Medication Sig Dispense Refill Last Dose    levothyroxine 100 MCG Oral Tab Take 1 tablet (100 mcg total) by mouth before breakfast. 90 tablet 3 1/11/2024    LORazepam 0.5 MG Oral Tab Take 1 tablet (0.5 mg total) by mouth 2 (two) times daily as needed for Anxiety. 60 tablet 3     Na Sulfate-K Sulfate-Mg Sulf (SUPREP BOWEL PREP KIT) 17.5-3.13-1.6 GM/177ML Oral Solution As directed. 1 each 0     clotrimazole-betamethasone 1-0.05 % External Cream ATAA BID (Patient not taking: Reported on 4/7/2023) 45 g 3      Current Facility-Administered Medications Ordered in Epic   Medication Dose Route Frequency Provider Last Rate Last Admin    lactated ringers infusion   Intravenous Continuous Yinka Gonzalez MD         No current [facility]-ordered outpatient medications on file.       Allergies   Allergen Reactions    Penicillins UNKNOWN       Family History   Problem Relation Age of Onset    Cancer Other     Lung Disorder Other      Social History      Socioeconomic History    Marital status:    Tobacco Use    Smoking status: Every Day     Types: Cigarettes    Smokeless tobacco: Never   Substance and Sexual Activity    Alcohol use: Yes     Comment: occassionally    Drug use: No   Other Topics Concern    Caffeine Concern Yes     Comment: COFFEE 2 CUPS DAILY       Available pre-op labs reviewed.             Vital Signs:  Body mass index is 26.54 kg/m².   height is 1.778 m (5' 10\") and weight is 83.9 kg (185 lb). His blood pressure is 120/86 and his pulse is 81. His respiration is 14 and oxygen saturation is 98%.   Vitals:    01/05/24 0949 01/11/24 1337   BP:  120/86   Pulse:  81   Resp:  14   SpO2:  98%   Weight: 83.9 kg (185 lb)    Height: 1.778 m (5' 10\")         Anesthesia Evaluation     Patient summary reviewed and Nursing notes reviewed    Airway   Mallampati: II  TM distance: >3 FB  Neck ROM: full  Dental - Dentition appears grossly intact     Pulmonary - normal exam     ROS comment: smoker  Cardiovascular - negative ROS and normal exam  Exercise tolerance: good    Neuro/Psych    (+)  neuromuscular disease, anxiety/panic attacks,        GI/Hepatic/Renal    (+) bowel prep    Endo/Other    (+) hypothyroidism  Abdominal                  Anesthesia Plan:   ASA:  2  Plan:   General and MAC  Informed Consent Plan and Risks Discussed With:  Patient  Discussed plan with:  Surgeon and CRNA      I have informed Collin WHITLEY Solomondelia and/or legal guardian or family member of the nature of the anesthetic plan, benefits, risks including possible dental damage if relevant, major complications, and any alternative forms of anesthetic management.   All of the patient's questions were answered to the best of my ability. The patient desires the anesthetic management as planned.  Rochelle Zamora CRNA  1/11/2024 1:53 PM  Present on Admission:  **None**

## 2024-01-11 NOTE — H&P
Pre Procedure History & Physical Examination    Patient Name: Collin Abdullahi  MRN: [id number]  CSN: [id number]  YOB: 1965    Diagnosis: history of polyps surveillance    Medications Prior to Admission   Medication Sig Dispense Refill Last Dose    levothyroxine 100 MCG Oral Tab Take 1 tablet (100 mcg total) by mouth before breakfast. 90 tablet 3 1/11/2024    LORazepam 0.5 MG Oral Tab Take 1 tablet (0.5 mg total) by mouth 2 (two) times daily as needed for Anxiety. 60 tablet 3     Na Sulfate-K Sulfate-Mg Sulf (SUPREP BOWEL PREP KIT) 17.5-3.13-1.6 GM/177ML Oral Solution As directed. 1 each 0     clotrimazole-betamethasone 1-0.05 % External Cream ATAA BID (Patient not taking: Reported on 4/7/2023) 45 g 3      Current Facility-Administered Medications   Medication Dose Route Frequency    lactated ringers infusion   Intravenous Continuous       Allergies:   Allergies   Allergen Reactions    Penicillins UNKNOWN       Past Medical History:   Diagnosis Date    Bell's palsy     Disorder of thyroid     Hypothyroidism     Status post wrist surgery      Past Surgical History:   Procedure Laterality Date    COLONOSCOPY N/A 10/23/2020    Procedure: COLONOSCOPY;  Surgeon: Yinka Gonzalez MD;  Location: [facility] ENDOSCOPY     Family History   Problem Relation Age of Onset    Cancer Other     Lung Disorder Other      Social History     Tobacco Use    Smoking status: Every Day     Types: Cigarettes    Smokeless tobacco: Never   Substance Use Topics    Alcohol use: Yes     Comment: occassionally         ROS:   CONSTITUTIONAL:  negative for fevers, rigors  EYES:  negative for diplopia   RESPIRATORY:  negative for severe shortness of breath  CARDIOVASCULAR:  negative for crushing sub-sternal chest pain  GASTROINTESTINAL:  see HPI  GENITOURINARY:  negative for dysuria or gross hematuria  INTEGUMENT/BREAST:  SKIN:  negative for jaundice   ALLERGIC/IMMUNOLOGIC:  negative for hay fever  ENDOCRINE:  negative for cold intolerance  and heat intolerance  MUSCULOSKELETAL:  negative for joint effusion/severe erythema  BEHAVIOR/PSYCH:  negative for psychotic behavior      PHYSICAL EXAM:   /86   Pulse 81   Resp 14   Ht 5' 10\" (1.778 m)   Wt 185 lb (83.9 kg)   SpO2 98%   BMI 26.54 kg/m²       Gen: Patient appears comfortable and in no acute discomfort  HEENT: the sclera appears anicteric, oropharynx clear, mucus membranes appear moist  CV: regular rate and rhythm, the extremities are warm and well perfused   Lung: Moves air well; No labored breathing  Abdomen: soft, non-tender exam in all quadrants without rigidity or guarding, non-distended, no abnormal bowel sounds noted, no masses are palpated  Skin: No jaundice  Ext: no cyanosis, clubbing or edema is evident.   Neuro: Alert and interactive, and gross movements of extremities normal    I have discussed the risks and benefits and alternatives of the procedure with the patient/family.  They understand and agree to proceed with plan of care.   I have reviewed recent H&P/clinic notes  Yinka Gonzalez MD  [facility] - Gastroenterology  1/11/2024  1:55 PM

## 2024-01-11 NOTE — ANESTHESIA POSTPROCEDURE EVALUATION
Patient: Collin Abdullahi    Procedure Summary       Date: 01/11/24 Room / Location: [facility] ENDOSCOPY 01 / [facility] ENDO    Anesthesia Start: 1350 Anesthesia Stop: 1422    Procedure: COLONOSCOPY WITH POLYPECTOMY Diagnosis:       Hx of colonic polyps      (POLYPS, HEMORRHOIDS)    Surgeons: Yinka Gonzalez MD Anesthesiologist:     Anesthesia Type: general, MAC ASA Status: 2            Anesthesia Type: general, MAC    Vitals Value Taken Time   /70 01/11/24 1421   Temp 98 °F (36.7 °C) 01/11/24 1421   Pulse 95 01/11/24 1421   Resp 20 01/11/24 1421   SpO2 99 % 01/11/24 1421       [facility] AN Post Evaluation:   Patient Evaluated in PACU  Patient Participation: complete - patient participated  Level of Consciousness: sleepy but conscious  Pain Score: 0  Pain Management: adequate  Airway Patency:patent  Dental exam unchanged from preop  Yes    Cardiovascular Status: acceptable  Respiratory Status: acceptable  Postoperative Hydration acceptable      Rochelle Zamora CRNA  1/11/2024 2:22 PM

## 2024-01-11 NOTE — DISCHARGE INSTRUCTIONS
Home Care Instructions for Colonoscopy  with Sedation    Diet:  - Resume your regular diet as tolerated unless otherwise instructed.  - Start with light meals to minimize bloating.  - Do not drink alcohol today.    Medication:  - If you have questions about resuming your normal medications, please contact your Primary Care Physician.    Activities:  - Take it easy today. Do not return to work today.  - Do not drive today.  - Do not operate any machinery today (including kitchen equipment).    Colonoscopy:  - You may notice some rectal \"spotting\" (a little blood on the toilet tissue) for a day or two after the exam. This is normal.  - If you experience any rectal bleeding (not spotting), persistent tenderness or sharp severe abdominal pains, oral temperature over 100 degrees Fahrenheit, light-headedness or dizziness, or any other problems, contact your doctor.      **If unable to reach your doctor, please go to the [facility] Emergency Room**    - Your referring physician will receive a full report of your examination.  - If you do not hear from your doctor's office within two weeks of your biopsy, please call them for your results.    You may be able to see your laboratory results in [employer] between 4 and 7 business days.  In some cases, your physician may not have viewed the results before they are released to [employer].  If you have questions regarding your results contact the physician who ordered the test/exam by phone or via [employer] by choosing \"Ask a Medical Question.\"

## 2024-01-16 ENCOUNTER — TELEPHONE (OUTPATIENT)
Facility: CLINIC | Age: 59
End: 2024-01-16

## 2024-01-16 NOTE — TELEPHONE ENCOUNTER
Recall colon in 3 years per Dr. Gonzalez. Colonoscopy done on 1/11/24.    Health maintenance updated and message sent to pt outreach to repeat colon in 3 years.     MyChart message from MD read by pt on 1/15/24.

## 2024-01-16 NOTE — TELEPHONE ENCOUNTER
----- Message from Yinka Gonzalez MD sent at 1/15/2024 11:51 AM CST -----  GI staff: please place recall for colonoscopy in 3 years

## 2024-01-22 ENCOUNTER — MED REC SCAN ONLY (OUTPATIENT)
Facility: CLINIC | Age: 59
End: 2024-01-22

## 2024-02-20 NOTE — TELEPHONE ENCOUNTER
Problem: Discharge Planning  Goal: Discharge to home or other facility with appropriate resources  Outcome: Progressing  Flowsheets  Taken 2/20/2024 0100  Discharge to home or other facility with appropriate resources: Identify barriers to discharge with patient and caregiver  Taken 2/19/2024 2145  Discharge to home or other facility with appropriate resources: Identify barriers to discharge with patient and caregiver     Problem: Safety - Adult  Goal: Free from fall injury  Outcome: Progressing  Flowsheets (Taken 2/20/2024 0400)  Free From Fall Injury: Instruct family/caregiver on patient safety     Problem: Chronic Conditions and Co-morbidities  Goal: Patient's chronic conditions and co-morbidity symptoms are monitored and maintained or improved  Outcome: Progressing  Flowsheets (Taken 2/19/2024 2145)  Care Plan - Patient's Chronic Conditions and Co-Morbidity Symptoms are Monitored and Maintained or Improved: Monitor and assess patient's chronic conditions and comorbid symptoms for stability, deterioration, or improvement     Problem: Skin/Tissue Integrity  Goal: Absence of new skin breakdown  Description: 1.  Monitor for areas of redness and/or skin breakdown  2.  Assess vascular access sites hourly  3.  Every 4-6 hours minimum:  Change oxygen saturation probe site  4.  Every 4-6 hours:  If on nasal continuous positive airway pressure, respiratory therapy assess nares and determine need for appliance change or resting period.  Outcome: Progressing      COVID triage:    Start of symptoms: 4/2 worse 4/3    Vaccinated: Yes  [x]   No []  Booster:  Yes  [x]  No []      Spoke with patient state he started having headache, facial pain, sinus pressure, nasal congestion, head congestion 2 days ago. Patient states symptoms got worse yesterday. Patient denies fever, GI symptoms, sob, chest pain. Patient states he took Nyquil which helped a little but did nothing for the facial pain or headache. Patient has not taken covid test, states he does not need to test for covid. Patient state he has a sinus infection and would like a z-debora. Patient does not think he needs to be evaluated. Patient states he has had sinus infections in the past and this feels the same.   To Dr. Saud Park please advise

## 2024-03-11 RX ORDER — LEVOTHYROXINE SODIUM 0.1 MG/1
100 TABLET ORAL
Qty: 90 TABLET | Refills: 0 | Status: SHIPPED | OUTPATIENT
Start: 2024-03-11

## 2024-03-11 NOTE — TELEPHONE ENCOUNTER
Refill request is for a maintenance medication and has met the criteria specified in the Ambulatory Medication Refill Standing Order for eligibility, visits, laboratory, alerts and was sent to the requested pharmacy.    Requested Prescriptions     Signed Prescriptions Disp Refills    levothyroxine 100 MCG Oral Tab 90 tablet 0     Sig: TAKE 1 TABLET(100 MCG) BY MOUTH BEFORE BREAKFAST     Authorizing Provider: NBA ALANIS     Ordering User: CLEMENTINE MARVIN

## 2024-05-09 ENCOUNTER — TELEPHONE (OUTPATIENT)
Dept: INTERNAL MEDICINE CLINIC | Facility: CLINIC | Age: 59
End: 2024-05-09

## 2024-05-09 RX ORDER — CEFUROXIME AXETIL 500 MG/1
500 TABLET ORAL 2 TIMES DAILY
Qty: 20 TABLET | Refills: 0 | Status: SHIPPED | OUTPATIENT
Start: 2024-05-09

## 2024-05-09 NOTE — TELEPHONE ENCOUNTER
To VINI cMcormick..    Pt. will take a Covid test and call back with the results but wanted me to inform you of his current illness and if you would prescribe anything until he is able to call back with Covid results.    Fever: Yes[]        No[x]        Unknown[]   []Temperature:   [x]Chills  [x]Night sweats  [x]Body aches    Cough: Yes[]        No[x]   []Productive cough  []Cough with exertion  []Dry cough    Respiratory Symptoms: Yes[]        No[x]   []Wheezing  []Pain with deep breathing  []SOB with exertion  []SOB at rest  []Heavy breathing  []Chest discomfort with deep breathing or coughing    GI Symptoms: Yes []     No[x]   []Diarrhea  []Nausea  []Vomiting  []Abdominal pain  []Lack of appetite    Other symptoms:  []Sore throat  [x]Sinus pressure  [x]Nasal drainage ( clear with yellow)  [x]Nasal congestion  []Chest congestion  [x]Head congestion  []PND  [x]Facial pain   [x]Earache ( Rt ear)  []Conjunctivitis  [x]Headache  []Fatigue  []Weakness  []Loss of sense of smell   []Loss of sense of taste    [x]OTC Medications: Nyquil and Dayquil    [x]Any recent travel ( pt currently in [place])  [] Any sick contacts    []Positive Covid exposure (<6 feet, >15 min, no mask worn)  If yes, date of exposure (last contact):     []Covid Test  Date/Result:      Vaccinated (covid): Yes [x]    No[]   Booster:  Yes[]   No[x]     Symptom onset: 5-7        Patient was notified that this message will be routed to the physician to determine what their recommendation (s) would be. In the meantime, if they develop new or worsening symptoms, they were advised to call back or seek emergent evaluation at the ER. ER precautions reviewed.   Reviewed business hours and the after-hour service for the on-call physician, I.e, concerns/questions.

## 2024-05-09 NOTE — TELEPHONE ENCOUNTER
Patient called complaining of sinus infection, ear infection, congestion, headache, no home covid test, for two days, taking day quill and ny quill with no relief.      Patient is in [place] for a business trip, please send to  [address]    [phone]

## 2024-05-09 NOTE — TELEPHONE ENCOUNTER
Pt. Called stating he is in [place] on Business and not able to get the covid test done in time before we close.

## 2024-05-20 RX ORDER — LEVOTHYROXINE SODIUM 0.1 MG/1
100 TABLET ORAL
Qty: 90 TABLET | Refills: 0 | Status: SHIPPED | OUTPATIENT
Start: 2024-05-20

## 2024-05-20 NOTE — TELEPHONE ENCOUNTER
Refill request is for a maintenance medication and has met the criteria specified in the Ambulatory Medication Refill Standing Order for eligibility, visits, laboratory, alerts and was sent to the requested pharmacy.    Requested Prescriptions     Signed Prescriptions Disp Refills    LEVOTHYROXINE 100 MCG Oral Tab 90 tablet 0     Sig: TAKE 1 TABLET(100 MCG) BY MOUTH BEFORE BREAKFAST     Authorizing Provider: NBA ALANIS     Ordering User: RAMONE PRITCHETT        3 month refill approved.  Mychart to patient to call office to schedule Annual Physical.    Last visit 4/7/23

## 2024-06-19 RX ORDER — LEVOTHYROXINE SODIUM 0.1 MG/1
100 TABLET ORAL
Qty: 90 TABLET | Refills: 0 | Status: SHIPPED | OUTPATIENT
Start: 2024-06-19

## 2024-06-19 NOTE — TELEPHONE ENCOUNTER
Refill request is for a maintenance medication and has met the criteria specified in the Ambulatory Medication Refill Standing Order for eligibility, visits, laboratory, alerts and was sent to the requested pharmacy.    Requested Prescriptions     Signed Prescriptions Disp Refills    levothyroxine 100 MCG Oral Tab 90 tablet 0     Sig: Take 1 tablet (100 mcg total) by mouth before breakfast.     Authorizing Provider: NBA ALANIS     Ordering User: HANNAH JONES     Will have physical in August with  labs prior

## 2024-08-02 ENCOUNTER — OFFICE VISIT (OUTPATIENT)
Dept: INTERNAL MEDICINE CLINIC | Facility: CLINIC | Age: 59
End: 2024-08-02

## 2024-08-02 VITALS
RESPIRATION RATE: 16 BRPM | DIASTOLIC BLOOD PRESSURE: 78 MMHG | BODY MASS INDEX: 26.5 KG/M2 | WEIGHT: 185.13 LBS | HEART RATE: 84 BPM | HEIGHT: 70 IN | OXYGEN SATURATION: 98 % | SYSTOLIC BLOOD PRESSURE: 124 MMHG

## 2024-08-02 DIAGNOSIS — F17.200 SMOKING: ICD-10-CM

## 2024-08-02 DIAGNOSIS — Z00.00 ANNUAL PHYSICAL EXAM: Primary | ICD-10-CM

## 2024-08-02 DIAGNOSIS — E78.00 HYPERCHOLESTEREMIA: ICD-10-CM

## 2024-08-02 DIAGNOSIS — Z12.5 SCREENING FOR PROSTATE CANCER: ICD-10-CM

## 2024-08-02 DIAGNOSIS — R35.89 POLYURIA: ICD-10-CM

## 2024-08-02 DIAGNOSIS — D12.6 ADENOMATOUS POLYP OF COLON, UNSPECIFIED PART OF COLON: ICD-10-CM

## 2024-08-02 DIAGNOSIS — G47.00 INSOMNIA, UNSPECIFIED TYPE: ICD-10-CM

## 2024-08-02 DIAGNOSIS — E03.9 HYPOTHYROIDISM, UNSPECIFIED TYPE: ICD-10-CM

## 2024-08-02 DIAGNOSIS — F32.A DEPRESSION, UNSPECIFIED DEPRESSION TYPE: ICD-10-CM

## 2024-08-02 DIAGNOSIS — L98.8 SKIN PLAQUE: ICD-10-CM

## 2024-08-02 PROCEDURE — 3008F BODY MASS INDEX DOCD: CPT | Performed by: INTERNAL MEDICINE

## 2024-08-02 PROCEDURE — 3074F SYST BP LT 130 MM HG: CPT | Performed by: INTERNAL MEDICINE

## 2024-08-02 PROCEDURE — 99396 PREV VISIT EST AGE 40-64: CPT | Performed by: INTERNAL MEDICINE

## 2024-08-02 PROCEDURE — 3078F DIAST BP <80 MM HG: CPT | Performed by: INTERNAL MEDICINE

## 2024-08-02 RX ORDER — LEVOTHYROXINE SODIUM 0.1 MG/1
100 TABLET ORAL
Qty: 90 TABLET | Refills: 3 | Status: SHIPPED | OUTPATIENT
Start: 2024-09-01

## 2024-08-02 RX ORDER — LORAZEPAM 0.5 MG/1
0.5 TABLET ORAL 2 TIMES DAILY PRN
Qty: 60 TABLET | Refills: 3 | Status: SHIPPED | OUTPATIENT
Start: 2024-08-02

## 2024-08-02 NOTE — PROGRESS NOTES
Collin Abdullahi is a 58 year old male.    HPI:     Chief Complaint   Patient presents with    Routine Physical       57 y/o M here for physical exam; hypothyroidism treated with levothyroxine 100 mcg po qD; has Rx for lorazepam 0.5 mg po BID prn, though uses rarely; smokes 1 ppd; no cough;  no CP; no SOB; no headaches; no palpitation;had colonoscopy on 1/11/24 by GI Dr Gonzalez with three polyps removed;       HISTORY:  Past Medical History:    Bell's palsy    Disorder of thyroid    Hypothyroidism    Status post wrist surgery      Past Surgical History:   Procedure Laterality Date    Colonoscopy N/A 10/23/2020    Procedure: COLONOSCOPY;  Surgeon: Yinka Gonzalez MD;  Location: [facility] ENDOSCOPY    Colonoscopy N/A 1/11/2024    Procedure: COLONOSCOPY WITH POLYPECTOMY;  Surgeon: Yinka Gonzalez MD;  Location: [facility]      Family History   Problem Relation Age of Onset    Cancer Other     Lung Disorder Other       Social History:   Social History     Socioeconomic History    Marital status:    Tobacco Use    Smoking status: Every Day     Types: Cigarettes    Smokeless tobacco: Never   Substance and Sexual Activity    Alcohol use: Yes     Comment: occassionally    Drug use: No   Other Topics Concern    Caffeine Concern Yes     Comment: COFFEE 2 CUPS DAILY        Medications (Active prior to today's visit):  Current Outpatient Medications   Medication Sig Dispense Refill    [START ON 9/1/2024] levothyroxine 100 MCG Oral Tab Take 1 tablet (100 mcg total) by mouth before breakfast. 90 tablet 3    LORazepam 0.5 MG Oral Tab Take 1 tablet (0.5 mg total) by mouth 2 (two) times daily as needed for Anxiety. 60 tablet 3    clotrimazole-betamethasone 1-0.05 % External Cream ATAA BID 45 g 3    cefuroxime 500 MG Oral Tab Take 1 tablet (500 mg total) by mouth 2 (two) times daily. (Patient not taking: Reported on 8/2/2024) 20 tablet 0       Allergies:  Allergies   Allergen Reactions    Penicillins UNKNOWN               ROS:    Constitutional: no weight loss; no fatigue  ENMT:  Negative for ear drainage, hearing loss and nasal drainage  Eyes:  Negative for eye discharge and vision loss  Cardiovascular:  Negative for chest pain; negative palpitations  Respiratory:  Negative for cough, dyspnea and wheezing  Endocrine:  Negative for abnormal sleep patterns, increased activity, polydipsia and polyphagia  Gastrointestinal:  Negative for abdominal pain, constipation, decreased appetite, diarrhea and vomiting; no melena or hematochezia  Musculoskeletal:  Negative for arthralgias or myalgias  Genitourinary:  Negative for dysuria or polyuria  Hema/Lymph:  Negative for easy bleeding and easy bruising  Integumentary:  Negative for pruritus and rash  Neurological:  Negative for gait disturbance; negative for paresthesias   All other review of systems are negative.        PHYSICAL EXAM:   Blood pressure 124/78, pulse 84, resp. rate 16, height 5' 10\" (1.778 m), weight 185 lb 2 oz (84 kg), SpO2 98%.  Constitutional: alert and oriented x3 in no acute distress  HEENT- EOMI, PERRL  Nose/Mouth/Throat: pharynx without erythema; no oral lesions  Neck/Thyroid: neck supple; no thyromegaly  Lymphatics: no lymphadenopathy of neck or groin  Cardiovascular: RRR, S1, S2, no S3 or murmur  Respiratory: lungs without crackles or wheezes  Abdomen: normoactive bowel sounds, soft, non-tender and non-distended  Extremities: no clubbing, cyanosis or edema  Vascular: no carotid bruits; DP/PT 2/2  Musculoskeletal: Motor 5/5 upper and lower extremities  Neurological: cranial nerves II-XII intact; light touch and proprioception intact  Skin: no rash or ulcerations         ASSESSMENT/PLAN:   Physical exam  flu vax annually  Hypothyroidism     Pt has autoimmune thyroid disease and +FHx (mother and father);  on levothyroxine 100 mcg po qD; check TFTs  Insomnia     was on lorazepam 0.5 mg po qHS prn, so he stopped med as it was not helping; has not responded to Ambien; sleeps 3-4  hours per night; tried Depakote  mg po at bedtime, though it was not effective; home sleep study not pursued  Depression with anxiety  Tried sertraline 50 mg po qD though did not respond, so he stopped med; did not respond to Depakote  mg po qHS; stable; will pursue optimization of sleep quality  -on lorazepam 0.5 mg po BID prn, #60, 3 RF; CPM  Smoking  Smokes 1 ppd; no cough; no SOB; check UA; does not want Chantix; advise heart scan to exclude atherosclerosis     hypercholesterolemia   in April 2023; goal LDL <100; advise heart scan to exclude atherosclerosis     Colon polyps  colonoscopy on 1/11/24 by GI Dr Gonzalez with three polyps removed; repeat in 3 years, or Jan 2027 per Dr Gonzalez    PSA screening  PSA 1.08 in April 2023     Skin Plaque  Favor psoriatic plaques; advise Lotrisone cream ATAA BID     Polyuria  UA neg hematuria in April 2023       Cell [phone]  RTC 1 year            Orders This Visit:  Orders Placed This Encounter   Procedures    CBC With Differential With Platelet    Comp Metabolic Panel (14)    Lipid Panel    Assay, Thyroid Stim Hormone    Free T4, (Free Thyroxine)    Urinalysis, Routine    PSA Total, Screen       Meds This Visit:  Requested Prescriptions     Signed Prescriptions Disp Refills    levothyroxine 100 MCG Oral Tab 90 tablet 3     Sig: Take 1 tablet (100 mcg total) by mouth before breakfast.    LORazepam 0.5 MG Oral Tab 60 tablet 3     Sig: Take 1 tablet (0.5 mg total) by mouth 2 (two) times daily as needed for Anxiety.       Imaging & Referrals:  None     8/2/2024  Gurvinder Giron MD

## 2025-01-17 NOTE — LETTER
7/24/2023    [address] [place]            Dear Misty Mack,      Our records indicate that you are due for an appointment for a Colonoscopy with Oli Mack MD. Our doctors are booking out about 3-5 months in advance for procedures. Please call our office to schedule a phone screening appointment to plan for the procedure(s). Your medical well-being is important to us. If your insurance requires a referral, please call your primary care office to request one.       Thank you,      The Physicians and Staff at [facility] [place] Quality 137: Melanoma: Continuity Of Care - Recall System: Patient information entered into a recall system that includes: target date for the next exam specified AND a process to follow up with patients regarding missed or unscheduled appointments Detail Level: Detailed Detail Level: Generalized

## 2025-06-21 ENCOUNTER — MOBILE ENCOUNTER (OUTPATIENT)
Dept: INTERNAL MEDICINE CLINIC | Facility: CLINIC | Age: 60
End: 2025-06-21

## 2025-06-21 RX ORDER — AZITHROMYCIN 250 MG/1
TABLET, FILM COATED ORAL
Qty: 6 TABLET | Refills: 0 | Status: SHIPPED | OUTPATIENT
Start: 2025-06-21

## (undated) DEVICE — LINE MNTR ADLT SET O2 INTMD

## (undated) DEVICE — FORCEP RADIAL JAW 4

## (undated) DEVICE — 6 ML SYRINGE LUER-LOCK TIP: Brand: MONOJECT

## (undated) DEVICE — STERILE LATEX POWDER-FREE SURGICAL GLOVESWITH NITRILE COATING: Brand: PROTEXIS

## (undated) DEVICE — Device: Brand: DUAL NARE NASAL CANNULAE FEMALE LUER CON 7FT O2 TUBE

## (undated) DEVICE — MEDI-VAC NON-CONDUCTIVE SUCTION TUBING 6MM X 1.8M (6FT.) L: Brand: CARDINAL HEALTH

## (undated) DEVICE — 3 ML SYRINGE LUER-LOCK TIP: Brand: MONOJECT

## (undated) DEVICE — Device: Brand: DEFENDO AIR/WATER/SUCTION AND BIOPSY VALVE

## (undated) DEVICE — GIJAW SINGLE-USE BIOPSY FORCEPS WITH NEEDLE: Brand: GIJAW

## (undated) DEVICE — KIT ENDO ORCAPOD 160/180/190

## (undated) DEVICE — KIT CLEAN ENDOKIT 1.1OZ GOWNX2

## (undated) DEVICE — 60 ML SYRINGE REGULAR TIP: Brand: MONOJECT

## (undated) DEVICE — Device: Brand: CUSTOM PROCEDURE KIT

## (undated) DEVICE — 35 ML SYRINGE REGULAR TIP: Brand: MONOJECT

## (undated) NOTE — LETTER
[facility]  [address], [place], [place]  Authorization for Surgical Operation and Procedure                                                                                           I hereby authorize Yinka Gonzalez MD, my physician and his/her assistants (if applicable), which may include medical students, residents, and/or fellows, to perform the following surgical operation/ procedure and administer such anesthesia as may be determined necessary by my physician: Operation/Procedure name (s) COLONOSCOPY on Collin Abdullahi   2.   I recognize that during the surgical operation/procedure, unforeseen conditions may necessitate additional or different procedures than those listed above.  I, therefore, further authorize and request that the above-named surgeon, assistants, or designees perform such procedures as are, in their judgment, necessary and desirable.    3.   My surgeon/physician has discussed prior to my surgery the potential benefits, risks and side effects of this procedure; the likelihood of achieving goals; and potential problems that might occur during recuperation.  They also discussed reasonable alternatives to the procedure, including risks, benefits, and side effects related to the alternatives and risks related to not receiving this procedure.  I have had all my questions answered and I acknowledge that no guarantee has been made as to the result that may be obtained.    4.   Should the need arise during my operation/procedure, which includes change of level of care prior to discharge, I also consent to the administration of blood and/or blood products.  Further, I understand that despite careful testing and screening of blood or blood products by collecting agencies, I may still be subject to ill effects as a result of receiving a blood transfusion and/or blood products.  The following are some, but not all, of the potential risks that can occur: fever and allergic  reactions, hemolytic reactions, transmission of diseases such as Hepatitis, AIDS and Cytomegalovirus (CMV) and fluid overload.  In the event that I wish to have an autologous transfusion of my own blood, or a directed donor transfusion, I will discuss this with my physician.  Check only if Refusing Blood or Blood Products  I understand refusal of blood or blood products as deemed necessary by my physician may have serious consequences to my condition to include possible death. I hereby assume responsibility for my refusal and release the hospital, its personnel, and my physicians from any responsibility for the consequences of my refusal.    o  Refuse   5.   I authorize the use of any specimen, organs, tissues, body parts or foreign objects that may be removed from my body during the operation/procedure for diagnosis, research or teaching purposes and their subsequent disposal by hospital authorities.  I also authorize the release of specimen test results and/or written reports to my treating physician on the hospital medical staff or other referring or consulting physicians involved in my care, at the discretion of the Pathologist or my treating physician.    6.   I consent to the photographing or videotaping of the operations or procedures to be performed, including appropriate portions of my body for medical, scientific, or educational purposes, provided my identity is not revealed by the pictures or by descriptive texts accompanying them.  If the procedure has been photographed/videotaped, the surgeon will obtain the original picture, image, videotape or CD.  The hospital will not be responsible for storage, release or maintenance of the picture, image, tape or CD.    7.   I consent to the presence of a  or observers in the operating room as deemed necessary by my physician or their designees.    8.   I recognize that in the event my procedure results in extended X-Ray/fluoroscopy time, I may  develop a skin reaction.    9. If I have a Do Not Attempt Resuscitation (DNAR) order in place, that status will be suspended while in the operating room, procedural suite, and during the recovery period unless otherwise explicitly stated by me (or a person authorized to consent on my behalf). The surgeon or my attending physician will determine when the applicable recovery period ends for purposes of reinstating the DNAR order.  10. Patients having a sterilization procedure: I understand that if the procedure is successful the results will be permanent and it will therefore be impossible for me to inseminate, conceive, or bear children.  I also understand that the procedure is intended to result in sterility, although the result has not been guaranteed.   11. I acknowledge that my physician has explained sedation/analgesia administration to me including the risk and benefits I consent to the administration of sedation/analgesia as may be necessary or desirable in the judgment of my physician.    I CERTIFY THAT I HAVE READ AND FULLY UNDERSTAND THE ABOVE CONSENT TO OPERATION and/or OTHER PROCEDURE.     _________________________________________ _________________________________     ___________________________________  Signature of Patient     Signature of Responsible Person                   Printed Name of Responsible Person                              _________________________________________ ______________________________        ___________________________________  Signature of Witness         Date  Time         Relationship to Patient    STATEMENT OF PHYSICIAN My signature below affirms that prior to the time of the procedure; I have explained to the patient and/or his/her legal representative, the risks and benefits involved in the proposed treatment and any reasonable alternative to the proposed treatment. I have also explained the risks and benefits involved in refusal of the proposed treatment and alternatives  to the proposed treatment and have answered the patient's questions. If I have a significant financial interest in a co-management agreement or a significant financial interest in any product or implant, or other significant relationship used in this procedure/surgery, I have disclosed this and had a discussion with my patient.     _______________________________________________________________ _____________________________  (Signature of Physician)                                                                                         (Date)                                   (Time)  Patient Name: Collin WHITLEY Bradly    : 1965   Printed: 1/10/2024      Medical Record #: [id number]                                              Page 1 of 1

## (undated) NOTE — LETTER
[facility]  Administration of Anesthesia  I, Collin Abdullahi agree to be cared for by a physician anesthesiologist alone and/or with a nurse anesthetist, who is specially trained to monitor me and give me medicine to put me to sleep or keep me comfortable during my procedure    I understand that my anesthesiologist and/or anesthetist is not an employee or agent of [employer] or [facility]. He or she works for [facility]    As the patient asking for anesthesia services, I agree to:  Allow the anesthesiologist (anesthesia doctor) to give me medicine and do additional procedures as necessary. Some examples are: Starting or using an “IV” to give me medicine, fluids or blood during my procedure, and having a breathing tube placed to help me breathe when I’m asleep (intubation). In the event that my heart stops working properly, I understand that my anesthesiologist will make every effort to sustain my life, unless otherwise directed by [employer] Do Not Resuscitate documents.  Tell my anesthesia doctor before my procedure:  If I am pregnant.  The last time that I ate or drank.  iii. All of the medicines I take (including prescriptions, herbal supplements, and pills I can buy without a prescription (including street drugs/illegal medications). Failure to inform my anesthesiologist about these medicines may increase my risk of anesthetic complications.  iv.If I am allergic to anything or have had a reaction to anesthesia before.  I understand how the anesthesia medicine will help me (benefits).  I understand that with any type of anesthesia medicine there are risks:  The most common risks are: nausea, vomiting, sore throat, muscle soreness, damage to my eyes, mouth, or teeth (from breathing tube placement).  Rare risks include: remembering what happened during my procedure, allergic reactions to medications, injury to my airway, heart, lungs, vision, nerves,  or muscles and in extremely rare instances death.  My doctor has explained to me other choices available to me for my care (alternatives).  Pregnant Patients (“epidural”):  I understand that the risks of having an epidural (medicine given into my back to help control pain during labor), include itching, low blood pressure, difficulty urinating, headache or slowing of the baby’s heart. Very rare risks include infection, bleeding, seizure, irregular heart rhythms and nerve injury.  Regional Anesthesia (“spinal”, “epidural”, & “nerve blocks”):  I understand that rare but potential complications include headache, bleeding, infection, seizure, irregular heart rhythms, and nerve injury.    _____________________________________________________________________________  Patient (or Representative) Signature/Relationship to Patient  Date   Time    _____________________________________________________________________________   Name (if used)    Language/Organization   Time    _____________________________________________________________________________  Nurse Anesthetist Signature     Date   Time  _____________________________________________________________________________  Anesthesiologist Signature     Date   Time  I have discussed the procedure and information above with the patient (or patient’s representative) and answered their questions. The patient or their representative has agreed to have anesthesia services.    _____________________________________________________________________________  Witness        Date   Time  I have verified that the signature is that of the patient or patient’s representative, and that it was signed before the procedure  Patient Name: Collin Abdullahi     : 1965                 Printed: 1/10/2024 at 8:17 AM    Medical Record #: [id number]                                            Page 1 of 1  ----------ANESTHESIA CONSENT----------